# Patient Record
Sex: FEMALE | Race: WHITE | NOT HISPANIC OR LATINO | URBAN - METROPOLITAN AREA
[De-identification: names, ages, dates, MRNs, and addresses within clinical notes are randomized per-mention and may not be internally consistent; named-entity substitution may affect disease eponyms.]

---

## 2017-12-27 ENCOUNTER — INPATIENT (INPATIENT)
Facility: HOSPITAL | Age: 82
LOS: 5 days | Discharge: NURSING, REHAB OR LONG-TERM CARE | End: 2018-01-02
Attending: INTERNAL MEDICINE | Admitting: INTERNAL MEDICINE
Payer: MEDICARE

## 2017-12-27 VITALS
HEART RATE: 77 BPM | TEMPERATURE: 98 F | SYSTOLIC BLOOD PRESSURE: 127 MMHG | WEIGHT: 119.93 LBS | RESPIRATION RATE: 19 BRPM | HEIGHT: 61 IN | OXYGEN SATURATION: 100 % | DIASTOLIC BLOOD PRESSURE: 68 MMHG

## 2017-12-27 LAB
ALBUMIN SERPL ELPH-MCNC: 2 G/DL — LOW (ref 3.3–5)
ALP SERPL-CCNC: 87 U/L — SIGNIFICANT CHANGE UP (ref 40–120)
ALT FLD-CCNC: 39 U/L — SIGNIFICANT CHANGE UP (ref 12–78)
ANION GAP SERPL CALC-SCNC: 10 MMOL/L — SIGNIFICANT CHANGE UP (ref 5–17)
APPEARANCE UR: (no result)
APTT BLD: 31.2 SEC — SIGNIFICANT CHANGE UP (ref 27.5–37.4)
AST SERPL-CCNC: 24 U/L — SIGNIFICANT CHANGE UP (ref 15–37)
BACTERIA # UR AUTO: (no result)
BASOPHILS # BLD AUTO: 0.1 K/UL — SIGNIFICANT CHANGE UP (ref 0–0.2)
BILIRUB SERPL-MCNC: 0.4 MG/DL — SIGNIFICANT CHANGE UP (ref 0.2–1.2)
BILIRUB UR-MCNC: NEGATIVE — SIGNIFICANT CHANGE UP
BUN SERPL-MCNC: 28 MG/DL — HIGH (ref 7–23)
CALCIUM SERPL-MCNC: 8.3 MG/DL — LOW (ref 8.5–10.1)
CHLORIDE SERPL-SCNC: 99 MMOL/L — SIGNIFICANT CHANGE UP (ref 96–108)
CO2 SERPL-SCNC: 24 MMOL/L — SIGNIFICANT CHANGE UP (ref 22–31)
COLOR SPEC: YELLOW — SIGNIFICANT CHANGE UP
CREAT SERPL-MCNC: 0.68 MG/DL — SIGNIFICANT CHANGE UP (ref 0.5–1.3)
DIFF PNL FLD: (no result)
EOSINOPHIL # BLD AUTO: 0.2 K/UL — SIGNIFICANT CHANGE UP (ref 0–0.5)
EOSINOPHIL NFR BLD AUTO: 2 % — SIGNIFICANT CHANGE UP (ref 0–6)
EPI CELLS # UR: SIGNIFICANT CHANGE UP
GLUCOSE SERPL-MCNC: 94 MG/DL — SIGNIFICANT CHANGE UP (ref 70–99)
GLUCOSE UR QL: NEGATIVE MG/DL — SIGNIFICANT CHANGE UP
GRAN CASTS # UR COMP ASSIST: (no result) /LPF
HCT VFR BLD CALC: 32.4 % — LOW (ref 34.5–45)
HGB BLD-MCNC: 10.6 G/DL — LOW (ref 11.5–15.5)
HMPV RNA SPEC QL NAA+PROBE: DETECTED
INR BLD: 1.6 RATIO — HIGH (ref 0.88–1.16)
KETONES UR-MCNC: NEGATIVE — SIGNIFICANT CHANGE UP
LACTATE SERPL-SCNC: 1.1 MMOL/L — SIGNIFICANT CHANGE UP (ref 0.7–2)
LEUKOCYTE ESTERASE UR-ACNC: (no result)
LYMPHOCYTES # BLD AUTO: 2.1 K/UL — SIGNIFICANT CHANGE UP (ref 1–3.3)
LYMPHOCYTES # BLD AUTO: 7 % — LOW (ref 13–44)
MACROCYTES BLD QL: SLIGHT — SIGNIFICANT CHANGE UP
MANUAL DIF COMMENT BLD-IMP: SIGNIFICANT CHANGE UP
MCHC RBC-ENTMCNC: 26.8 PG — LOW (ref 27–34)
MCHC RBC-ENTMCNC: 32.8 GM/DL — SIGNIFICANT CHANGE UP (ref 32–36)
MCV RBC AUTO: 82 FL — SIGNIFICANT CHANGE UP (ref 80–100)
MONOCYTES # BLD AUTO: 4.3 K/UL — HIGH (ref 0–0.9)
MONOCYTES NFR BLD AUTO: 28 % — HIGH (ref 2–14)
NEUTROPHILS # BLD AUTO: 12.7 K/UL — HIGH (ref 1.8–7.4)
NEUTROPHILS NFR BLD AUTO: 63 % — SIGNIFICANT CHANGE UP (ref 43–77)
NITRITE UR-MCNC: NEGATIVE — SIGNIFICANT CHANGE UP
PH UR: 8 — SIGNIFICANT CHANGE UP (ref 5–8)
PLAT MORPH BLD: NORMAL — SIGNIFICANT CHANGE UP
PLATELET # BLD AUTO: 312 K/UL — SIGNIFICANT CHANGE UP (ref 150–400)
POLYCHROMASIA BLD QL SMEAR: SLIGHT — SIGNIFICANT CHANGE UP
POTASSIUM SERPL-MCNC: 3.8 MMOL/L — SIGNIFICANT CHANGE UP (ref 3.5–5.3)
POTASSIUM SERPL-SCNC: 3.8 MMOL/L — SIGNIFICANT CHANGE UP (ref 3.5–5.3)
PROT SERPL-MCNC: 7.3 GM/DL — SIGNIFICANT CHANGE UP (ref 6–8.3)
PROT UR-MCNC: 30 MG/DL
PROTHROM AB SERPL-ACNC: 17.5 SEC — HIGH (ref 9.8–12.7)
RAPID RVP RESULT: DETECTED
RBC # BLD: 3.96 M/UL — SIGNIFICANT CHANGE UP (ref 3.8–5.2)
RBC # FLD: 18 % — HIGH (ref 10.3–14.5)
RBC BLD AUTO: (no result)
RBC CASTS # UR COMP ASSIST: (no result) /HPF (ref 0–4)
SODIUM SERPL-SCNC: 133 MMOL/L — LOW (ref 135–145)
SP GR SPEC: 1.01 — SIGNIFICANT CHANGE UP (ref 1.01–1.02)
TRI-PHOS CRY UR QL COMP ASSIST: (no result)
UROBILINOGEN FLD QL: NEGATIVE MG/DL — SIGNIFICANT CHANGE UP
WBC # BLD: 19.3 K/UL — HIGH (ref 3.8–10.5)
WBC # FLD AUTO: 19.3 K/UL — HIGH (ref 3.8–10.5)
WBC UR QL: >50

## 2017-12-27 PROCEDURE — 71010: CPT | Mod: 26

## 2017-12-27 PROCEDURE — 99285 EMERGENCY DEPT VISIT HI MDM: CPT

## 2017-12-27 PROCEDURE — 93010 ELECTROCARDIOGRAM REPORT: CPT

## 2017-12-27 RX ORDER — CEFTRIAXONE 500 MG/1
1 INJECTION, POWDER, FOR SOLUTION INTRAMUSCULAR; INTRAVENOUS ONCE
Qty: 0 | Refills: 0 | Status: DISCONTINUED | OUTPATIENT
Start: 2017-12-27 | End: 2017-12-27

## 2017-12-27 RX ORDER — SODIUM CHLORIDE 9 MG/ML
3 INJECTION INTRAMUSCULAR; INTRAVENOUS; SUBCUTANEOUS ONCE
Qty: 0 | Refills: 0 | Status: COMPLETED | OUTPATIENT
Start: 2017-12-27 | End: 2017-12-27

## 2017-12-27 RX ORDER — CEFTRIAXONE 500 MG/1
1000 INJECTION, POWDER, FOR SOLUTION INTRAMUSCULAR; INTRAVENOUS ONCE
Qty: 0 | Refills: 0 | Status: COMPLETED | OUTPATIENT
Start: 2017-12-27 | End: 2017-12-27

## 2017-12-27 RX ADMIN — SODIUM CHLORIDE 3 MILLILITER(S): 9 INJECTION INTRAMUSCULAR; INTRAVENOUS; SUBCUTANEOUS at 20:20

## 2017-12-27 NOTE — ED ADULT NURSE NOTE - CHIEF COMPLAINT QUOTE
Pt BIBA from from [facility] for elevated temperature. Pt has c/o cough and swollen left wrist swollen and tender.

## 2017-12-27 NOTE — ED PROVIDER NOTE - OBJECTIVE STATEMENT
89 y/o female with PMHx of CHF, a-fib, s/p appendectomy presents to the ED BIBA from [facility] c/o fever yesterday (measured 104 yesterday, orally). Pt reports a dry cough x2 weeks, and swollen left hand with pain, constipation for 4 days, loss of appetite. Pt is on oxygen at the nursing home. Pt had 2 chest x-rays done recently and was told she doesn't have PNA. No abd pain, CP, SOB, jaw pain, shoulder pain, hematuria. A doctor at [facility] told pt her swollen left hand was due to rheumatoid arthritis. Pt's appetite has improved slightly today. Cardio: Dr. KAUR from [facility] in [place]. 89 y/o female with PMHx of CHF, a-fib, s/p appendectomy presents to the ED BIBA from [facility] c/o fever yesterday (measured 104 yesterday, orally). Pt reports a dry cough x2 weeks, and swollen left hand with pain, constipation for 4 days, loss of appetite. Pt is on oxygen at the nursing home. Pt had 2 chest x-rays done recently and was told she doesn't have PNA. No abd pain, CP, SOB, jaw pain, shoulder pain, hematuria. A doctor at [facility] told pt her swollen left hand was due to rheumatoid arthritis as swelling is only limited to the hand joints and not elbow or arm. Pt's appetite has improved slightly today. Cardio: Dr. TOMLIN" from [email or web address] in [place].

## 2017-12-27 NOTE — ED PROVIDER NOTE - PMH
Afib    CHF (congestive heart failure) Afib    CHF (congestive heart failure)    Rheumatoid arthritis, involving unspecified site, unspecified rheumatoid factor presence

## 2017-12-27 NOTE — ED PROVIDER NOTE - CARE PLAN
Principal Discharge DX:	Urinary tract infection without hematuria, site unspecified  Secondary Diagnosis:	Upper respiratory tract infection, unspecified type

## 2017-12-27 NOTE — ED PROVIDER NOTE - MEDICAL DECISION MAKING DETAILS
Sunita THAO: UTI, fever, admitted for further inpatient care to rule out worsening UTI/sepsis. Hospitalist admission is appreciated.

## 2017-12-27 NOTE — ED ADULT TRIAGE NOTE - CHIEF COMPLAINT QUOTE
Pt BIBA from from [facility] for elevated temperature. Pt has c/o cough and swollen left wrist swollen and tender.

## 2017-12-27 NOTE — ED PROVIDER NOTE - NS_ ATTENDINGSCRIBEDETAILS _ED_A_ED_FT
I Blake Dorsey MD saw and examined this patient. Scribe documented for me and under my supervision. I have modified the scribe's note where necessary to reflect my history, physical exam findings and other relevant documentation pertaining to the care of this patient.

## 2017-12-28 DIAGNOSIS — Z96.641 PRESENCE OF RIGHT ARTIFICIAL HIP JOINT: Chronic | ICD-10-CM

## 2017-12-28 DIAGNOSIS — Z90.710 ACQUIRED ABSENCE OF BOTH CERVIX AND UTERUS: Chronic | ICD-10-CM

## 2017-12-28 LAB
ANION GAP SERPL CALC-SCNC: 7 MMOL/L — SIGNIFICANT CHANGE UP (ref 5–17)
ANISOCYTOSIS BLD QL: SLIGHT — SIGNIFICANT CHANGE UP
BASOPHILS # BLD AUTO: 0.1 K/UL — SIGNIFICANT CHANGE UP (ref 0–0.2)
BUN SERPL-MCNC: 26 MG/DL — HIGH (ref 7–23)
BURR CELLS BLD QL SMEAR: PRESENT — SIGNIFICANT CHANGE UP
CALCIUM SERPL-MCNC: 8 MG/DL — LOW (ref 8.5–10.1)
CHLORIDE SERPL-SCNC: 101 MMOL/L — SIGNIFICANT CHANGE UP (ref 96–108)
CO2 SERPL-SCNC: 27 MMOL/L — SIGNIFICANT CHANGE UP (ref 22–31)
CREAT SERPL-MCNC: 0.5 MG/DL — SIGNIFICANT CHANGE UP (ref 0.5–1.3)
DIGOXIN SERPL-MCNC: 1.26 NG/ML — SIGNIFICANT CHANGE UP (ref 0.8–2)
ELLIPTOCYTES BLD QL SMEAR: SLIGHT — SIGNIFICANT CHANGE UP
EOSINOPHIL # BLD AUTO: 0.4 K/UL — SIGNIFICANT CHANGE UP (ref 0–0.5)
GLUCOSE SERPL-MCNC: 79 MG/DL — SIGNIFICANT CHANGE UP (ref 70–99)
HCT VFR BLD CALC: 30 % — LOW (ref 34.5–45)
HGB BLD-MCNC: 9.6 G/DL — LOW (ref 11.5–15.5)
HYPOCHROMIA BLD QL: SLIGHT — SIGNIFICANT CHANGE UP
LYMPHOCYTES # BLD AUTO: 1.2 K/UL — SIGNIFICANT CHANGE UP (ref 1–3.3)
LYMPHOCYTES # BLD AUTO: 19 % — SIGNIFICANT CHANGE UP (ref 13–44)
MACROCYTES BLD QL: SLIGHT — SIGNIFICANT CHANGE UP
MANUAL DIF COMMENT BLD-IMP: SIGNIFICANT CHANGE UP
MCHC RBC-ENTMCNC: 26.6 PG — LOW (ref 27–34)
MCHC RBC-ENTMCNC: 32.2 GM/DL — SIGNIFICANT CHANGE UP (ref 32–36)
MCV RBC AUTO: 82.6 FL — SIGNIFICANT CHANGE UP (ref 80–100)
MONOCYTES # BLD AUTO: 3 K/UL — HIGH (ref 0–0.9)
MONOCYTES NFR BLD AUTO: 27 % — HIGH (ref 2–14)
NEUTROPHILS # BLD AUTO: 8 K/UL — HIGH (ref 1.8–7.4)
NEUTROPHILS NFR BLD AUTO: 53 % — SIGNIFICANT CHANGE UP (ref 43–77)
NEUTS BAND # BLD: 1 % — SIGNIFICANT CHANGE UP (ref 0–8)
PLAT MORPH BLD: NORMAL — SIGNIFICANT CHANGE UP
PLATELET # BLD AUTO: 288 K/UL — SIGNIFICANT CHANGE UP (ref 150–400)
POIKILOCYTOSIS BLD QL AUTO: SLIGHT — SIGNIFICANT CHANGE UP
POTASSIUM SERPL-MCNC: 3.7 MMOL/L — SIGNIFICANT CHANGE UP (ref 3.5–5.3)
POTASSIUM SERPL-SCNC: 3.7 MMOL/L — SIGNIFICANT CHANGE UP (ref 3.5–5.3)
RBC # BLD: 3.63 M/UL — LOW (ref 3.8–5.2)
RBC # FLD: 18.3 % — HIGH (ref 10.3–14.5)
RBC BLD AUTO: (no result)
SCHISTOCYTES BLD QL AUTO: SLIGHT — SIGNIFICANT CHANGE UP
SODIUM SERPL-SCNC: 135 MMOL/L — SIGNIFICANT CHANGE UP (ref 135–145)
WBC # BLD: 12.6 K/UL — HIGH (ref 3.8–10.5)
WBC # FLD AUTO: 12.6 K/UL — HIGH (ref 3.8–10.5)

## 2017-12-28 PROCEDURE — 73110 X-RAY EXAM OF WRIST: CPT | Mod: 26,LT

## 2017-12-28 RX ORDER — APIXABAN 2.5 MG/1
1 TABLET, FILM COATED ORAL
Qty: 0 | Refills: 0 | COMMUNITY

## 2017-12-28 RX ORDER — FAMOTIDINE 10 MG/ML
20 INJECTION INTRAVENOUS
Qty: 0 | Refills: 0 | COMMUNITY

## 2017-12-28 RX ORDER — ACETAMINOPHEN 500 MG
650 TABLET ORAL EVERY 6 HOURS
Qty: 0 | Refills: 0 | Status: DISCONTINUED | OUTPATIENT
Start: 2017-12-28 | End: 2018-01-02

## 2017-12-28 RX ORDER — APIXABAN 2.5 MG/1
2.5 TABLET, FILM COATED ORAL EVERY 12 HOURS
Qty: 0 | Refills: 0 | Status: DISCONTINUED | OUTPATIENT
Start: 2017-12-28 | End: 2018-01-02

## 2017-12-28 RX ORDER — METOPROLOL TARTRATE 50 MG
25 TABLET ORAL EVERY 8 HOURS
Qty: 0 | Refills: 0 | Status: DISCONTINUED | OUTPATIENT
Start: 2017-12-28 | End: 2018-01-02

## 2017-12-28 RX ORDER — INFLUENZA VIRUS VACCINE 15; 15; 15; 15 UG/.5ML; UG/.5ML; UG/.5ML; UG/.5ML
0.5 SUSPENSION INTRAMUSCULAR ONCE
Qty: 0 | Refills: 0 | Status: COMPLETED | OUTPATIENT
Start: 2017-12-28 | End: 2017-12-28

## 2017-12-28 RX ORDER — FAMOTIDINE 10 MG/ML
20 INJECTION INTRAVENOUS DAILY
Qty: 0 | Refills: 0 | Status: DISCONTINUED | OUTPATIENT
Start: 2017-12-28 | End: 2018-01-02

## 2017-12-28 RX ORDER — CEFTRIAXONE 500 MG/1
1 INJECTION, POWDER, FOR SOLUTION INTRAMUSCULAR; INTRAVENOUS EVERY 24 HOURS
Qty: 0 | Refills: 0 | Status: DISCONTINUED | OUTPATIENT
Start: 2017-12-28 | End: 2017-12-28

## 2017-12-28 RX ORDER — DIGOXIN 250 MCG
1 TABLET ORAL
Qty: 0 | Refills: 0 | COMMUNITY

## 2017-12-28 RX ORDER — METOPROLOL TARTRATE 50 MG
0 TABLET ORAL
Qty: 0 | Refills: 0 | COMMUNITY

## 2017-12-28 RX ORDER — DIGOXIN 250 MCG
0.12 TABLET ORAL DAILY
Qty: 0 | Refills: 0 | Status: DISCONTINUED | OUTPATIENT
Start: 2017-12-28 | End: 2018-01-02

## 2017-12-28 RX ORDER — CEFTRIAXONE 500 MG/1
1000 INJECTION, POWDER, FOR SOLUTION INTRAMUSCULAR; INTRAVENOUS EVERY 24 HOURS
Qty: 0 | Refills: 0 | Status: DISCONTINUED | OUTPATIENT
Start: 2017-12-28 | End: 2017-12-31

## 2017-12-28 RX ORDER — DILTIAZEM HCL 120 MG
120 CAPSULE, EXT RELEASE 24 HR ORAL DAILY
Qty: 0 | Refills: 0 | Status: DISCONTINUED | OUTPATIENT
Start: 2017-12-28 | End: 2018-01-02

## 2017-12-28 RX ORDER — ACETAMINOPHEN 500 MG
2 TABLET ORAL
Qty: 0 | Refills: 0 | COMMUNITY

## 2017-12-28 RX ADMIN — CEFTRIAXONE 1000 MILLIGRAM(S): 500 INJECTION, POWDER, FOR SOLUTION INTRAMUSCULAR; INTRAVENOUS at 21:46

## 2017-12-28 RX ADMIN — FAMOTIDINE 20 MILLIGRAM(S): 10 INJECTION INTRAVENOUS at 11:14

## 2017-12-28 RX ADMIN — CEFTRIAXONE 1000 MILLIGRAM(S): 500 INJECTION, POWDER, FOR SOLUTION INTRAMUSCULAR; INTRAVENOUS at 01:02

## 2017-12-28 RX ADMIN — Medication 25 MILLIGRAM(S): at 13:57

## 2017-12-28 RX ADMIN — APIXABAN 2.5 MILLIGRAM(S): 2.5 TABLET, FILM COATED ORAL at 17:01

## 2017-12-28 RX ADMIN — Medication 0.12 MILLIGRAM(S): at 06:01

## 2017-12-28 RX ADMIN — Medication 120 MILLIGRAM(S): at 06:01

## 2017-12-28 RX ADMIN — Medication 25 MILLIGRAM(S): at 21:34

## 2017-12-28 RX ADMIN — Medication 25 MILLIGRAM(S): at 06:01

## 2017-12-28 RX ADMIN — APIXABAN 2.5 MILLIGRAM(S): 2.5 TABLET, FILM COATED ORAL at 06:01

## 2017-12-28 NOTE — H&P ADULT - NSHPLABSRESULTS_GEN_ALL_CORE
10.6   19.3  )-----------( 312      ( 27 Dec 2017 19:37 )             32.4         133<L>  |  99  |  28<H>  ----------------------------<  94  3.8   |  24  |  0.68    Ca    8.3<L>      27 Dec 2017 19:37    TPro  7.3  /  Alb  2.0<L>  /  TBili  0.4  /  DBili  x   /  AST  24  /  ALT  39  /  AlkPhos  87          LIVER FUNCTIONS - ( 27 Dec 2017 19:37 )  Alb: 2.0 g/dL / Pro: 7.3 gm/dL / ALK PHOS: 87 U/L / ALT: 39 U/L / AST: 24 U/L / GGT: x           PT/INR - ( 27 Dec 2017 19:37 )   PT: 17.5 sec;   INR: 1.60 ratio         PTT - ( 27 Dec 2017 19:37 )  PTT:31.2 sec  Urinalysis Basic - ( 27 Dec 2017 19:37 )    Color: Yellow / Appearance: Slightly Turbid / S.010 / pH: x  Gluc: x / Ketone: Negative  / Bili: Negative / Urobili: Negative mg/dL   Blood: x / Protein: 30 mg/dL / Nitrite: Negative   Leuk Esterase: Moderate / RBC: 6-10 /HPF / WBC >50   Sq Epi: x / Non Sq Epi: Few / Bacteria: Moderate        Lactate, Blood: 1.1 mmol/L ( @ 19:37)    Blood, Urine: Moderate ( @ 19:37)

## 2017-12-28 NOTE — CONSULT NOTE ADULT - ASSESSMENT
Patient is 89-year-old female with a past medical history significant for atrial fibrillation admitted on 12/27 for evaluation of 2 weeks of fever and nonproductive cough associated with myalgia; of note she has burning with urination and currently is having chills, possibly rigors. Unclear if she had sick contacts; no other specific complaints.   1.  Patient admitted with URI with human metapneumovirus  - no evidence of pneumonia on chest xray  - iv hydration and supportive care   - oxygen and nebs as needed   - serial cbc and monitor temperature   - contact and droplet isolation  2. Pyuria, rule out urinary tract infection  - will continue ceftriaxone as ordered  - follow up cultures   3. other issues: afib  - per medicine

## 2017-12-28 NOTE — PATIENT PROFILE ADULT. - FALL HARM RISK
age(85 years old or older) age(85 years old or older)/coagulation(Bleeding disorder R/T clinical cond/anti-coags)

## 2017-12-28 NOTE — PROVIDER CONTACT NOTE (OTHER) - SITUATION
Tele service spoke with Cate to request consult.  Dr Rodriguez called back to advise she does not handle this type of fracture at [place] and requested to call Dr Dumas.  Advised charge DAVID.

## 2017-12-28 NOTE — PROVIDER CONTACT NOTE (OTHER) - SITUATION
Tele service spoke with Cate to request consult. Tele service spoke with Cate to request consult. Dr Nam called back to say he will not cover this patient because it is a fracture and he does not cover ortho hand.

## 2017-12-28 NOTE — PROGRESS NOTE ADULT - SUBJECTIVE AND OBJECTIVE BOX
CC:  Patient is a 89y old  Female who presents with a chief complaint of cough (28 Dec 2017 01:30)    SUBJECTIVE:  offers no new complaints.    ROS:  (+) cough.    acetaminophen   Tablet 650 milliGRAM(s) Oral every 6 hours PRN  apixaban 2.5 milliGRAM(s) Oral every 12 hours  cefTRIAXone Injectable 1000 milliGRAM(s) IV Push every 24 hours  digoxin     Tablet 0.125 milliGRAM(s) Oral daily  diltiazem    milliGRAM(s) Oral daily  famotidine    Tablet 20 milliGRAM(s) Oral daily  metoprolol     tartrate 25 milliGRAM(s) Oral every 8 hours    T(C): 36.3 (12-28-17 @ 06:43), Max: 36.8 (12-27-17 @ 18:15)  HR: 79 (12-28-17 @ 06:43) (76 - 98)  BP: 115/52 (12-28-17 @ 06:43) (102/53 - 127/68)  RR: 16 (12-28-17 @ 06:43) (16 - 19)  SpO2: 100% (12-28-17 @ 06:43) (97% - 100%)    PHYSICAL EXAM:  General apperance: Patient in no acute distress, Alert and Oriented x 2, " year = 1979, president = thalia "   	HEENT: No JVD, no cervical lymphadenopathy   	CVS: S1 S2 no murmurs, rubs or gallops   	Lung: DIminished Breath Sounds bilaterally   	Abdomen: Soft non tender non distended + BS   	Extremity: no lower extremity edema   	MSK: 5/5 strength in all four extremities , sensation grossly intact throughout, mild erythema of the left distal extremity  Back: No spinal tenderness                      9.6    12.6  )-----------( 288      ( 28 Dec 2017 06:09 )             30.0     12-28    135  |  101  |  26<H>  ----------------------------<  79  3.7   |  27  |  0.50    Ca    8.0<L>      28 Dec 2017 06:09    TPro  7.3  /  Alb  2.0<L>  /  TBili  0.4  /  DBili  x   /  AST  24  /  ALT  39  /  AlkPhos  87  12-27 CC:  Patient is a 89y old  Female who presents with a chief complaint of cough (28 Dec 2017 01:30)    SUBJECTIVE:  comfortable.  offers no new complaints.    ROS:  (+) cough.  (+) mild dysuria.    acetaminophen   Tablet 650 milliGRAM(s) Oral every 6 hours PRN  apixaban 2.5 milliGRAM(s) Oral every 12 hours  cefTRIAXone Injectable 1000 milliGRAM(s) IV Push every 24 hours  digoxin     Tablet 0.125 milliGRAM(s) Oral daily  diltiazem    milliGRAM(s) Oral daily  famotidine    Tablet 20 milliGRAM(s) Oral daily  metoprolol     tartrate 25 milliGRAM(s) Oral every 8 hours    T(C): 36.3 (12-28-17 @ 06:43), Max: 36.8 (12-27-17 @ 18:15)  HR: 79 (12-28-17 @ 06:43) (76 - 98)  BP: 115/52 (12-28-17 @ 06:43) (102/53 - 127/68)  RR: 16 (12-28-17 @ 06:43) (16 - 19)  SpO2: 100% (12-28-17 @ 06:43) (97% - 100%)    PHYSICAL EXAM:  General apperance: Patient in no acute distress, Alert and Oriented x 2, " year = 1979, president = thalia "   	HEENT: No JVD, no cervical lymphadenopathy   	CVS: S1 S2 no murmurs, rubs or gallops   	Lung: DIminished Breath Sounds bilaterally   	Abdomen: Soft non tender non distended + BS   	Extremity: no lower extremity edema   	MSK: 5/5 strength in all four extremities , sensation grossly intact throughout, mild erythema of the left distal extremity  Back: No spinal tenderness                      9.6    12.6  )-----------( 288      ( 28 Dec 2017 06:09 )             30.0     12-28    135  |  101  |  26<H>  ----------------------------<  79  3.7   |  27  |  0.50    Ca    8.0<L>      28 Dec 2017 06:09    TPro  7.3  /  Alb  2.0<L>  /  TBili  0.4  /  DBili  x   /  AST  24  /  ALT  39  /  AlkPhos  87  12-27

## 2017-12-28 NOTE — H&P ADULT - NSHPPHYSICALEXAM_GEN_ALL_CORE
Vital signsT(C): 36.8 (12-27-17 @ 18:15), Max: 36.8 (12-27-17 @ 18:15)  HR: 98 (12-27-17 @ 21:51) (77 - 98)  BP: 103/62 (12-27-17 @ 21:51) (103/62 - 127/68)  RR: 16 (12-27-17 @ 21:51) (16 - 19)  SpO2: 99% (12-27-17 @ 21:51) (99% - 100%)  Wt(kg): --  General apperance: Patient in no acute distress, Alert and Oriented x 2, " year = 1979, president = thalia "   HEENT: No JVD, no cervical lymphadenopathy   CVS: S1 S2 no murmurs, rubs or gallops   Lung: DIminished Breath Sounds bilaterally   Abdomen: Soft non tender non distended + BS   Extremity: no lower extremity edema   MSK: 5/5 strength in all four extremities , sensation grossly intact throughout   Back: No spinal tenderness Vital signsT(C): 36.8 (12-27-17 @ 18:15), Max: 36.8 (12-27-17 @ 18:15)  HR: 98 (12-27-17 @ 21:51) (77 - 98)  BP: 103/62 (12-27-17 @ 21:51) (103/62 - 127/68)  RR: 16 (12-27-17 @ 21:51) (16 - 19)  SpO2: 99% (12-27-17 @ 21:51) (99% - 100%)  Wt(kg): --  General apperance: Patient in no acute distress, Alert and Oriented x 2, " year = 1979, president = thalia "   HEENT: No JVD, no cervical lymphadenopathy   CVS: S1 S2 no murmurs, rubs or gallops   Lung: DIminished Breath Sounds bilaterally   Abdomen: Soft non tender non distended + BS   Extremity: no lower extremity edema   MSK: 5/5 strength in all four extremities , sensation grossly intact throughout, mild erythema of the left distal extremity  Back: No spinal tenderness

## 2017-12-28 NOTE — PROGRESS NOTE ADULT - ASSESSMENT
89F.  admitted 12/27/17.  from [facility].  p/w cough x 2 weeks + fever.  also, c/o left wrist erythema and tenderness.    PMHx:  AF (E);  HF.    UTI.  sepsis upon admission.  -f/u Cx.  -c/w ceftriaxone.    cough.  -(+) RVP, human metapneumovirus.  -supportive measures.    hx AF.  -rate controlled.  -c/w Eliquis.  -c/w NDP-CCB + BB.    advanced directive.  -DNR.    DVT prophylaxis.  -Eliquis.    disposition.  -general medical abdi.    communication.  -d/w RN.  -d/w Case Management. 89F.  admitted 12/27/17.  from [facility].  p/w cough x 2 weeks + fever.  also, c/o left wrist erythema and tenderness.    PMHx:  AF (E);  HF.    UTI.  sepsis upon admission.  -f/u Cx.  -c/w ceftriaxone.  -ID consult.    cough.  -(+) RVP, human metapneumovirus.  -supportive measures.    hx AF.  -rate controlled.  -c/w Eliquis.  -c/w NDP-CCB + BB.    advanced directive.  -DNR.    DVT prophylaxis.  -Eliquis.    disposition.  -general medical abdi.    communication.  -d/w RN.  -d/w Case Management. 89F.  admitted 12/27/17.  from [facility].  p/w cough x 2 weeks + fever.  also, c/o left wrist erythema and tenderness.    PMHx:  AF (E);  HF.    UTI.  sepsis upon admission.  -f/u Cx.  -c/w ceftriaxone.  -ID consult.    cough.  -(+) RVP, human metapneumovirus.  -supportive measures.    left wrist pain.  -xray c/w age-indeterminate avulsion fracture of the ulnar styloid and possibly pisiform bone.   -orthopedics consult.    hx AF.  -rate controlled.  -c/w Eliquis.  -c/w NDP-CCB + BB.    advanced directive.  -DNR.    DVT prophylaxis.  -Eliquis.    disposition.  -general medical abdi.    communication.  -d/w RN.  -d/w Case Management. 89F.  admitted 12/27/17.  from [facility].  p/w cough x 2 weeks + fever.  also, c/o left wrist erythema and tenderness.    PMHx:  AF (E);  HF.    UTI.  sepsis upon admission.  -f/u Cx.  -c/w ceftriaxone.  -ID consult.    cough.  -(+) RVP, human metapneumovirus.  -supportive measures.    left wrist pain.  -xray c/w age-indeterminate avulsion fracture of the ulnar styloid and possibly pisiform bone.   -Plastic surgery consult.    hx AF.  -rate controlled.  -c/w Eliquis.  -c/w NDP-CCB + BB.    advanced directive.  -DNR.    DVT prophylaxis.  -Eliquis.    disposition.  -general medical abdi.    communication.  -d/w RN.  -d/w Case Management.

## 2017-12-28 NOTE — CONSULT NOTE ADULT - SUBJECTIVE AND OBJECTIVE BOX
HPI:  Patient is 89-year-old female with a past medical history significant for atrial fibrillation admitted on  for evaluation of 2 weeks of fever and nonproductive cough associated with myalgia; of note she has burning with urination and currently is having chills, possibly rigors. Unclear if she had sick contacts; no other specific complaints.             PMH: as above  PSH: as above  Meds: per reconcilation sheet, noted below  MEDICATIONS  (STANDING):  apixaban 2.5 milliGRAM(s) Oral every 12 hours  cefTRIAXone Injectable 1000 milliGRAM(s) IV Push every 24 hours  digoxin     Tablet 0.125 milliGRAM(s) Oral daily  diltiazem    milliGRAM(s) Oral daily  famotidine    Tablet 20 milliGRAM(s) Oral daily  metoprolol     tartrate 25 milliGRAM(s) Oral every 8 hours    MEDICATIONS  (PRN):  acetaminophen   Tablet 650 milliGRAM(s) Oral every 6 hours PRN For Temp greater than 38.5 C (101.3 F)    Allergies    No Known Allergies    Intolerances      Social: no smoking, no alcohol, no illegal drugs; no recent travel, no exposure to TB  FAMILY HISTORY:  No pertinent family history in first degree relatives    ROS: the patient has  no HA, no dizziness, no sore throat, no blurry vision, no CP, no palpitations, no SOB, no cough, no abdominal pain, no diarrhea, no N/V,  no leg pain, no claudication, no rash, no joint aches, no rectal pain or bleeding, no night sweats  Vital Signs Last 24 Hrs  T(C): 36.7 (28 Dec 2017 11:02), Max: 36.8 (27 Dec 2017 18:15)  T(F): 98 (28 Dec 2017 11:02), Max: 98.3 (27 Dec 2017 18:15)  HR: 77 (28 Dec 2017 11:02) (76 - 98)  BP: 108/60 (28 Dec 2017 11:02) (102/53 - 127/68)  BP(mean): --  RR: 16 (28 Dec 2017 11:02) (16 - 19)  SpO2: 98% (28 Dec 2017 11:02) (97% - 100%)  Daily Height in cm: 154.94 (27 Dec 2017 18:15)    Daily   Constitutional: frail looking  HEENT: NC/AT, EOMI, PERRLA  Neck: supple  Respiratory: clear, no r/r/w  Cardiovascular: S1S2 regular, no murmurs  Abdomen: soft, not tender, not distended, positive BS  Genitourinary: deferred  Rectal: deferred  Musculoskeletal: no muscle tenderness, no joint swelling or tenderness  Neurological: AxOx3, moving all extremities, no focal deficits  Skin: no rashes                          9.6    12.6  )-----------( 288      ( 28 Dec 2017 06:09 )             30.0     12    135  |  101  |  26<H>  ----------------------------<  79  3.7   |  27  |  0.50    Ca    8.0<L>      28 Dec 2017 06:09    TPro  7.3  /  Alb  2.0<L>  /  TBili  0.4  /  DBili  x   /  AST  24  /  ALT  39  /  AlkPhos  87       LIVER FUNCTIONS - ( 27 Dec 2017 19:37 )  Alb: 2.0 g/dL / Pro: 7.3 gm/dL / ALK PHOS: 87 U/L / ALT: 39 U/L / AST: 24 U/L / GGT: x           Urinalysis Basic - ( 27 Dec 2017 19:37 )    Color: Yellow / Appearance: Slightly Turbid / S.010 / pH: x  Gluc: x / Ketone: Negative  / Bili: Negative / Urobili: Negative mg/dL   Blood: x / Protein: 30 mg/dL / Nitrite: Negative   Leuk Esterase: Moderate / RBC: 6-10 /HPF / WBC >50   Sq Epi: x / Non Sq Epi: Few / Bacteria: Moderate    Rapid Respiratory Viral Panel (27.17 @ 20:47)    Rapid RVP Result: Detected: The [employer] RVP Rapid uses polymerase chain reaction (PCR) and melt  curve analysis to screen for adenovirus; coronavirus HKU1, NL63, 229E,  OC43; human metapneumovirus (hMPV); human enterovirus/rhinovirus  (Entero/RV); influenza A; influenza A/H1;influenza A/H3; influenza  A/H1-2009; influenza B; parainfluenza viruses 1, 2, 3, 4; respiratory  syncytial virus; Bordetella pertussis; Mycoplasma pneumoniae; and  Chlamydophila pneumoniae.    hMPV (RapRVP): Detected: Test Name:rvp  Called by:marcial  Called to:rn k im  Read back 2 Pt IDs:y Read back values:y Date/Tm:17 22:21          Radiology:< from: Xray Chest 1 View AP/PA. (17 @ 21:56) >    EXAM:  CHEST SINGLE VIEW FRONTAL                            PROCEDURE DATE:  2017          INTERPRETATION:  Clinical information: Fever and cough. Rule out   pneumonia.    AP view of the chest    COMPARISON: None    FINDINGS: The heart appears enlarged although not well quantified due to   AP technique. There is no pleural effusion, lung consolidation or   pneumothorax.    IMPRESSION: Clear lungs.    < end of copied text >      Advanced directive addressed: full resuscitation

## 2017-12-28 NOTE — H&P ADULT - HISTORY OF PRESENT ILLNESS
This is a very pleasant 89-year-old female with a past medical history significant for atrial fibrillation who was brought in cough for the last 2 weeks high-grade temperature of 104 measured orally.  Overall the patient notes that over the last several weeks of persistent dry cough muscle aches and pains.  With the onset of fevers the patient was noted to have recurrent chills. While at the facility the patient underwent a CXR and RVP which were negative. Upon arrival to ED the patient was noted to be febrile and underwent repeat RVP which was positive for Human metapneumovirus. She was also note to have a + UA.   Also of note the patient was noted to c/o L wrist erythema and tenderness

## 2017-12-28 NOTE — H&P ADULT - ASSESSMENT
This is a 89 year old female admittted for severe sepsis secondary to Genitourinary source likely gram negative organism further complicated by concurrent human metapneumovirus.     ID: UTI sepsis   continue with Ceftriaxone  follow up urine cultures and blood cultures   monitor I/Os   Follow up official CXR, prelim no appreciable infiltrate noted     Human metapneumovirus   supportive care  patient received IVF in ED   would refrain from additional maintenance fluids due to history of CHF     CARD:   Check Digoxin level   continue with Eliquis   continue with Cardizem and Metoprolol with hold parameters  Continue with pravachol     HEme: Leukocytosis secondary to Sepsis/ Viral ; Relative Monocytosis   likely secondary to acute viral infection     DVT ppx: ELiquis    CODE DNR per Documentation from [facility]

## 2017-12-28 NOTE — PATIENT PROFILE ADULT. - PSH
No significant past surgical history H/O total hysterectomy    History of total right hip replacement  20yrs ago

## 2017-12-29 LAB
-  AMIKACIN: SIGNIFICANT CHANGE UP
-  AMPICILLIN/SULBACTAM: SIGNIFICANT CHANGE UP
-  AMPICILLIN: SIGNIFICANT CHANGE UP
-  AZTREONAM: SIGNIFICANT CHANGE UP
-  CEFAZOLIN: SIGNIFICANT CHANGE UP
-  CEFEPIME: SIGNIFICANT CHANGE UP
-  CEFOXITIN: SIGNIFICANT CHANGE UP
-  CEFTAZIDIME: SIGNIFICANT CHANGE UP
-  CEFTRIAXONE: SIGNIFICANT CHANGE UP
-  CIPROFLOXACIN: SIGNIFICANT CHANGE UP
-  ERTAPENEM: SIGNIFICANT CHANGE UP
-  GENTAMICIN: SIGNIFICANT CHANGE UP
-  LEVOFLOXACIN: SIGNIFICANT CHANGE UP
-  MEROPENEM: SIGNIFICANT CHANGE UP
-  NITROFURANTOIN: SIGNIFICANT CHANGE UP
-  PIPERACILLIN/TAZOBACTAM: SIGNIFICANT CHANGE UP
-  TOBRAMYCIN: SIGNIFICANT CHANGE UP
-  TRIMETHOPRIM/SULFAMETHOXAZOLE: SIGNIFICANT CHANGE UP
ANION GAP SERPL CALC-SCNC: 8 MMOL/L — SIGNIFICANT CHANGE UP (ref 5–17)
BUN SERPL-MCNC: 20 MG/DL — SIGNIFICANT CHANGE UP (ref 7–23)
CALCIUM SERPL-MCNC: 7.9 MG/DL — LOW (ref 8.5–10.1)
CHLORIDE SERPL-SCNC: 102 MMOL/L — SIGNIFICANT CHANGE UP (ref 96–108)
CO2 SERPL-SCNC: 23 MMOL/L — SIGNIFICANT CHANGE UP (ref 22–31)
CREAT SERPL-MCNC: 0.42 MG/DL — LOW (ref 0.5–1.3)
CULTURE RESULTS: SIGNIFICANT CHANGE UP
GLUCOSE SERPL-MCNC: 99 MG/DL — SIGNIFICANT CHANGE UP (ref 70–99)
HCT VFR BLD CALC: 31 % — LOW (ref 34.5–45)
HGB BLD-MCNC: 9.7 G/DL — LOW (ref 11.5–15.5)
MCHC RBC-ENTMCNC: 25.6 PG — LOW (ref 27–34)
MCHC RBC-ENTMCNC: 31.2 GM/DL — LOW (ref 32–36)
MCV RBC AUTO: 82.1 FL — SIGNIFICANT CHANGE UP (ref 80–100)
METHOD TYPE: SIGNIFICANT CHANGE UP
ORGANISM # SPEC MICROSCOPIC CNT: SIGNIFICANT CHANGE UP
ORGANISM # SPEC MICROSCOPIC CNT: SIGNIFICANT CHANGE UP
PLATELET # BLD AUTO: 276 K/UL — SIGNIFICANT CHANGE UP (ref 150–400)
POTASSIUM SERPL-MCNC: 3.6 MMOL/L — SIGNIFICANT CHANGE UP (ref 3.5–5.3)
POTASSIUM SERPL-SCNC: 3.6 MMOL/L — SIGNIFICANT CHANGE UP (ref 3.5–5.3)
RBC # BLD: 3.78 M/UL — LOW (ref 3.8–5.2)
RBC # FLD: 18.2 % — HIGH (ref 10.3–14.5)
SODIUM SERPL-SCNC: 133 MMOL/L — LOW (ref 135–145)
SPECIMEN SOURCE: SIGNIFICANT CHANGE UP
WBC # BLD: 14 K/UL — HIGH (ref 3.8–10.5)
WBC # FLD AUTO: 14 K/UL — HIGH (ref 3.8–10.5)

## 2017-12-29 RX ADMIN — Medication 25 MILLIGRAM(S): at 07:01

## 2017-12-29 RX ADMIN — Medication 200 MILLIGRAM(S): at 17:07

## 2017-12-29 RX ADMIN — Medication 25 MILLIGRAM(S): at 23:46

## 2017-12-29 RX ADMIN — APIXABAN 2.5 MILLIGRAM(S): 2.5 TABLET, FILM COATED ORAL at 06:57

## 2017-12-29 RX ADMIN — Medication 25 MILLIGRAM(S): at 14:04

## 2017-12-29 RX ADMIN — CEFTRIAXONE 1000 MILLIGRAM(S): 500 INJECTION, POWDER, FOR SOLUTION INTRAMUSCULAR; INTRAVENOUS at 23:46

## 2017-12-29 RX ADMIN — FAMOTIDINE 20 MILLIGRAM(S): 10 INJECTION INTRAVENOUS at 12:41

## 2017-12-29 RX ADMIN — Medication 650 MILLIGRAM(S): at 23:46

## 2017-12-29 RX ADMIN — Medication 200 MILLIGRAM(S): at 12:41

## 2017-12-29 RX ADMIN — APIXABAN 2.5 MILLIGRAM(S): 2.5 TABLET, FILM COATED ORAL at 17:06

## 2017-12-29 RX ADMIN — Medication 120 MILLIGRAM(S): at 06:58

## 2017-12-29 RX ADMIN — Medication 200 MILLIGRAM(S): at 07:01

## 2017-12-29 RX ADMIN — Medication 0.12 MILLIGRAM(S): at 06:58

## 2017-12-29 RX ADMIN — Medication 650 MILLIGRAM(S): at 12:42

## 2017-12-29 NOTE — PROGRESS NOTE ADULT - SUBJECTIVE AND OBJECTIVE BOX
CC:  Patient is a 89y old  Female who presents with a chief complaint of cough (28 Dec 2017 01:30)    SUBJECTIVE:  comfortable.  offers no new complaints.    ROS:  (+) cough.  (+) mild dysuria.    acetaminophen   Tablet 650 milliGRAM(s) Oral every 6 hours PRN  apixaban 2.5 milliGRAM(s) Oral every 12 hours  cefTRIAXone Injectable 1000 milliGRAM(s) IV Push every 24 hours  digoxin     Tablet 0.125 milliGRAM(s) Oral daily  diltiazem    milliGRAM(s) Oral daily  famotidine    Tablet 20 milliGRAM(s) Oral daily  metoprolol     tartrate 25 milliGRAM(s) Oral every 8 hours    T(C): 36.3 (12-28-17 @ 06:43), Max: 36.8 (12-27-17 @ 18:15)  HR: 79 (12-28-17 @ 06:43) (76 - 98)  BP: 115/52 (12-28-17 @ 06:43) (102/53 - 127/68)  RR: 16 (12-28-17 @ 06:43) (16 - 19)  SpO2: 100% (12-28-17 @ 06:43) (97% - 100%)    PHYSICAL EXAM:  General apperance: Patient in no acute distress, Alert and Oriented x 2, " year = 1979, president = thalia "   	HEENT: No JVD, no cervical lymphadenopathy   	CVS: S1 S2 no murmurs, rubs or gallops   	Lung: DIminished Breath Sounds bilaterally   	Abdomen: Soft non tender non distended + BS   	Extremity: no lower extremity edema   	MSK: (+) contracture right 3rd and 4th digit.  Back: No spinal tenderness                      9.6    12.6  )-----------( 288      ( 28 Dec 2017 06:09 )             30.0     12-28    135  |  101  |  26<H>  ----------------------------<  79  3.7   |  27  |  0.50    Ca    8.0<L>      28 Dec 2017 06:09    TPro  7.3  /  Alb  2.0<L>  /  TBili  0.4  /  DBili  x   /  AST  24  /  ALT  39  /  AlkPhos  87  12-27

## 2017-12-29 NOTE — PROGRESS NOTE ADULT - SUBJECTIVE AND OBJECTIVE BOX
HPI:  Patient is 89-year-old female with a past medical history significant for atrial fibrillation admitted on  for evaluation of 2 weeks of fever and nonproductive cough associated with myalgia; of note she has burning with urination and currently is having chills, possibly rigors. Unclear if she had sick contacts; no other specific complaints.   Today  patient comfortable, still with cough    MEDICATIONS  (STANDING):  apixaban 2.5 milliGRAM(s) Oral every 12 hours  cefTRIAXone Injectable 1000 milliGRAM(s) IV Push every 24 hours  digoxin     Tablet 0.125 milliGRAM(s) Oral daily  diltiazem    milliGRAM(s) Oral daily  famotidine    Tablet 20 milliGRAM(s) Oral daily  influenza   Vaccine 0.5 milliLiter(s) IntraMuscular once  metoprolol     tartrate 25 milliGRAM(s) Oral every 8 hours    MEDICATIONS  (PRN):  acetaminophen   Tablet 650 milliGRAM(s) Oral every 6 hours PRN For Temp greater than 38.5 C (101.3 F)  guaiFENesin    Syrup 200 milliGRAM(s) Oral every 6 hours PRN Cough      Vital Signs Last 24 Hrs  T(C): 37.2 (29 Dec 2017 14:07), Max: 38.6 (29 Dec 2017 12:10)  T(F): 99 (29 Dec 2017 14:07), Max: 101.4 (29 Dec 2017 12:10)  HR: 87 (29 Dec 2017 12:10) (76 - 87)  BP: 138/67 (29 Dec 2017 12:10) (106/56 - 138/67)  BP(mean): --  RR: 18 (29 Dec 2017 12:10) (18 - 18)  SpO2: 98% (29 Dec 2017 12:10) (83% - 100%)    Physical Exam:        Daily   Constitutional: frail looking  HEENT: NC/AT, EOMI, PERRLA  Neck: supple  Respiratory: clear, no r/r/w  Cardiovascular: S1S2 regular, no murmurs  Abdomen: soft, not tender, not distended, positive BS  Genitourinary: deferred  Rectal: deferred  Musculoskeletal: no muscle tenderness, no joint swelling or tenderness  Neurological: AxOx3, moving all extremities, no focal deficits  Skin: no rashes               Labs:                        9.7    14.0  )-----------( 276      ( 29 Dec 2017 07:26 )             31.0         133<L>  |  102  |  20  ----------------------------<  99  3.6   |  23  |  0.42<L>    Ca    7.9<L>      29 Dec 2017 07:26    TPro  7.3  /  Alb  2.0<L>  /  TBili  0.4  /  DBili  x   /  AST  24  /  ALT  39  /  AlkPhos  87             Cultures:       Culture - Urine (collected 17 @ 19:37)  Source: .Urine Clean Catch (Midstream)  Preliminary Report (17 @ 19:06):    >100,000 CFU/ml Proteus mirabilis    Culture - Blood (collected 17 @ 19:37)  Source: .Blood Blood-Peripheral  Preliminary Report (17 @ 01:03):    No growth to date.    Culture - Blood (collected 17 @ 19:37)  Source: .Blood Blood-Peripheral  Preliminary Report (17 @ 01:03):    No growth to date.                     9.6    12.6  )-----------( 288      ( 28 Dec 2017 06:09 )             30.0         135  |  101  |  26<H>  ----------------------------<  79  3.7   |  27  |  0.50    Ca    8.0<L>      28 Dec 2017 06:09    TPro  7.3  /  Alb  2.0<L>  /  TBili  0.4  /  DBili  x   /  AST  24  /  ALT  39  /  AlkPhos  87       LIVER FUNCTIONS - ( 27 Dec 2017 19:37 )  Alb: 2.0 g/dL / Pro: 7.3 gm/dL / ALK PHOS: 87 U/L / ALT: 39 U/L / AST: 24 U/L / GGT: x           Urinalysis Basic - ( 27 Dec 2017 19:37 )    Color: Yellow / Appearance: Slightly Turbid / S.010 / pH: x  Gluc: x / Ketone: Negative  / Bili: Negative / Urobili: Negative mg/dL   Blood: x / Protein: 30 mg/dL / Nitrite: Negative   Leuk Esterase: Moderate / RBC: 6-10 /HPF / WBC >50   Sq Epi: x / Non Sq Epi: Few / Bacteria: Moderate    Rapid Respiratory Viral Panel (17 @ 20:47)    Rapid RVP Result: Detected: The FilmArray RVP Rapid uses polymerase chain reaction (PCR) and melt  curve analysis to screen for adenovirus; coronavirus HKU1, NL63, 229E,  OC43; human metapneumovirus (hMPV); human enterovirus/rhinovirus  (Entero/RV); influenza A; influenza A/H1;influenza A/H3; influenza  A/H1-2009; influenza B; parainfluenza viruses 1, 2, 3, 4; respiratory  syncytial virus; Bordetella pertussis; Mycoplasma pneumoniae; and  Chlamydophila pneumoniae.    hMPV (RapRVP): Detected: Test Name:rvp  Called by:marcial  Called to:rn k im  Read back 2 Pt IDs:y Read back values:y Date/Tm:17 22:21          Radiology:< from: Xray Chest 1 View AP/PA. (17 @ 21:56) >    EXAM:  CHEST SINGLE VIEW FRONTAL                            PROCEDURE DATE:  2017          INTERPRETATION:  Clinical information: Fever and cough. Rule out   pneumonia.    AP view of the chest    COMPARISON: None    FINDINGS: The heart appears enlarged although not well quantified due to   AP technique. There is no pleural effusion, lung consolidation or   pneumothorax.    IMPRESSION: Clear lungs.    < end of copied text >      Advanced directive addressed: full resuscitation

## 2017-12-29 NOTE — PROGRESS NOTE ADULT - ASSESSMENT
Patient is 89-year-old female with a past medical history significant for atrial fibrillation admitted on 12/27 for evaluation of 2 weeks of fever and nonproductive cough associated with myalgia; of note she has burning with urination and currently is having chills, possibly rigors. Unclear if she had sick contacts; no other specific complaints.   1.  Patient admitted with URI with human metapneumovirus  - no evidence of pneumonia on chest xray  - iv hydration and supportive care   - oxygen and nebs as needed   - serial cbc and monitor temperature   - contact and droplet isolation  2. Pyuria, rule out urinary tract infection  - day #2 ceftriaxone  - tolerating antibiotics without rashes or side effects   - follow up cultures   3. other issues: afib  - per medicine

## 2017-12-29 NOTE — PROGRESS NOTE ADULT - ASSESSMENT
89F.  admitted 12/27/17.  from [facility].  p/w cough x 2 weeks + fever.  also, c/o left wrist erythema and tenderness.    PMHx:  AF (E);  HF.    UTI.  sepsis upon admission.  -f/u Cx.  -c/w ceftriaxone.  -ID consult.    cough.  -(+) RVP, human metapneumovirus.  -supportive measures.    left wrist pain.  -xray c/w age-indeterminate avulsion fracture of the ulnar styloid and possibly pisiform bone.   -Plastic surgery consult.    hx AF.  -rate controlled.  -c/w Eliquis.  -c/w NDP-CCB + BB.    advanced directive.  -DNR.    DVT prophylaxis.  -Eliquis.    disposition.  -general medical abdi.    communication.  -d/w RN.  -d/w Case Management.

## 2017-12-30 LAB
ANION GAP SERPL CALC-SCNC: 6 MMOL/L — SIGNIFICANT CHANGE UP (ref 5–17)
BASOPHILS # BLD AUTO: 0.1 K/UL — SIGNIFICANT CHANGE UP (ref 0–0.2)
BUN SERPL-MCNC: 17 MG/DL — SIGNIFICANT CHANGE UP (ref 7–23)
CALCIUM SERPL-MCNC: 8.1 MG/DL — LOW (ref 8.5–10.1)
CHLORIDE SERPL-SCNC: 99 MMOL/L — SIGNIFICANT CHANGE UP (ref 96–108)
CO2 SERPL-SCNC: 27 MMOL/L — SIGNIFICANT CHANGE UP (ref 22–31)
CREAT SERPL-MCNC: 0.48 MG/DL — LOW (ref 0.5–1.3)
EOSINOPHIL # BLD AUTO: 0 K/UL — SIGNIFICANT CHANGE UP (ref 0–0.5)
GLUCOSE SERPL-MCNC: 104 MG/DL — HIGH (ref 70–99)
HCT VFR BLD CALC: 30.2 % — LOW (ref 34.5–45)
HGB BLD-MCNC: 9.7 G/DL — LOW (ref 11.5–15.5)
LACTATE SERPL-SCNC: 1.1 MMOL/L — SIGNIFICANT CHANGE UP (ref 0.7–2)
LYMPHOCYTES # BLD AUTO: 1.7 K/UL — SIGNIFICANT CHANGE UP (ref 1–3.3)
LYMPHOCYTES # BLD AUTO: 12 % — LOW (ref 13–44)
MANUAL DIF COMMENT BLD-IMP: SIGNIFICANT CHANGE UP
MCHC RBC-ENTMCNC: 26.3 PG — LOW (ref 27–34)
MCHC RBC-ENTMCNC: 32.1 GM/DL — SIGNIFICANT CHANGE UP (ref 32–36)
MCV RBC AUTO: 82.1 FL — SIGNIFICANT CHANGE UP (ref 80–100)
MONOCYTES # BLD AUTO: 6.9 K/UL — HIGH (ref 0–0.9)
MONOCYTES NFR BLD AUTO: 38 % — HIGH (ref 2–14)
NEUTROPHILS # BLD AUTO: 10 K/UL — HIGH (ref 1.8–7.4)
NEUTROPHILS NFR BLD AUTO: 50 % — SIGNIFICANT CHANGE UP (ref 43–77)
PLAT MORPH BLD: NORMAL — SIGNIFICANT CHANGE UP
PLATELET # BLD AUTO: 292 K/UL — SIGNIFICANT CHANGE UP (ref 150–400)
POTASSIUM SERPL-MCNC: 3.7 MMOL/L — SIGNIFICANT CHANGE UP (ref 3.5–5.3)
POTASSIUM SERPL-SCNC: 3.7 MMOL/L — SIGNIFICANT CHANGE UP (ref 3.5–5.3)
RBC # BLD: 3.67 M/UL — LOW (ref 3.8–5.2)
RBC # FLD: 17.9 % — HIGH (ref 10.3–14.5)
RBC BLD AUTO: SIGNIFICANT CHANGE UP
SODIUM SERPL-SCNC: 132 MMOL/L — LOW (ref 135–145)
WBC # BLD: 18.7 K/UL — HIGH (ref 3.8–10.5)
WBC # FLD AUTO: 18.7 K/UL — HIGH (ref 3.8–10.5)

## 2017-12-30 PROCEDURE — 73080 X-RAY EXAM OF ELBOW: CPT | Mod: 26,LT

## 2017-12-30 PROCEDURE — 73100 X-RAY EXAM OF WRIST: CPT | Mod: 26

## 2017-12-30 RX ORDER — SODIUM CHLORIDE 9 MG/ML
1000 INJECTION INTRAMUSCULAR; INTRAVENOUS; SUBCUTANEOUS
Qty: 0 | Refills: 0 | Status: DISCONTINUED | OUTPATIENT
Start: 2017-12-30 | End: 2017-12-31

## 2017-12-30 RX ADMIN — Medication 25 MILLIGRAM(S): at 06:00

## 2017-12-30 RX ADMIN — FAMOTIDINE 20 MILLIGRAM(S): 10 INJECTION INTRAVENOUS at 11:11

## 2017-12-30 RX ADMIN — CEFTRIAXONE 1000 MILLIGRAM(S): 500 INJECTION, POWDER, FOR SOLUTION INTRAMUSCULAR; INTRAVENOUS at 21:46

## 2017-12-30 RX ADMIN — Medication 25 MILLIGRAM(S): at 21:45

## 2017-12-30 RX ADMIN — Medication 650 MILLIGRAM(S): at 14:13

## 2017-12-30 RX ADMIN — Medication 25 MILLIGRAM(S): at 14:24

## 2017-12-30 RX ADMIN — APIXABAN 2.5 MILLIGRAM(S): 2.5 TABLET, FILM COATED ORAL at 17:25

## 2017-12-30 RX ADMIN — SODIUM CHLORIDE 50 MILLILITER(S): 9 INJECTION INTRAMUSCULAR; INTRAVENOUS; SUBCUTANEOUS at 17:26

## 2017-12-30 RX ADMIN — APIXABAN 2.5 MILLIGRAM(S): 2.5 TABLET, FILM COATED ORAL at 06:00

## 2017-12-30 RX ADMIN — Medication 0.12 MILLIGRAM(S): at 06:00

## 2017-12-30 RX ADMIN — Medication 120 MILLIGRAM(S): at 06:00

## 2017-12-30 NOTE — PROGRESS NOTE ADULT - SUBJECTIVE AND OBJECTIVE BOX
HPI:  Patient is 89-year-old female with a past medical history significant for atrial fibrillation admitted on  for evaluation of 2 weeks of fever and nonproductive cough associated with myalgia; of note she has burning with urination and currently is having chills, possibly rigors. Unclear if she had sick contacts; no other specific complaints.   Today  patient comfortable, still with cough  Today  patient still coughing    MEDICATIONS  (STANDING):  apixaban 2.5 milliGRAM(s) Oral every 12 hours  cefTRIAXone Injectable 1000 milliGRAM(s) IV Push every 24 hours  digoxin     Tablet 0.125 milliGRAM(s) Oral daily  diltiazem    milliGRAM(s) Oral daily  famotidine    Tablet 20 milliGRAM(s) Oral daily  influenza   Vaccine 0.5 milliLiter(s) IntraMuscular once  metoprolol     tartrate 25 milliGRAM(s) Oral every 8 hours  sodium chloride 0.9%. 1000 milliLiter(s) (50 mL/Hr) IV Continuous <Continuous>    MEDICATIONS  (PRN):  acetaminophen   Tablet 650 milliGRAM(s) Oral every 6 hours PRN For Temp greater than 38.5 C (101.3 F)  guaiFENesin    Syrup 200 milliGRAM(s) Oral every 6 hours PRN Cough      Vital Signs Last 24 Hrs  T(C): 37.2 (30 Dec 2017 11:43), Max: 39 (29 Dec 2017 23:43)  T(F): 99 (30 Dec 2017 11:43), Max: 102.2 (29 Dec 2017 23:43)  HR: 72 (30 Dec 2017 11:43) (72 - 94)  BP: 124/54 (30 Dec 2017 11:43) (107/58 - 129/50)  BP(mean): --  RR: 20 (30 Dec 2017 11:43) (18 - 28)  SpO2: 97% (30 Dec 2017 11:43) (97% - 100%)    Physical Exam:        Daily   Constitutional: frail looking  HEENT: NC/AT, EOMI, PERRLA  Neck: supple  Respiratory: clear, no r/r/w  Cardiovascular: S1S2 regular, no murmurs  Abdomen: soft, not tender, not distended, positive BS  Genitourinary: deferred  Rectal: deferred  Musculoskeletal: no muscle tenderness, no joint swelling or tenderness  Neurological: AxOx3, moving all extremities, no focal deficits  Skin: no rashes               Labs:    Labs:                        9.7    18.7  )-----------( 292      ( 30 Dec 2017 08:01 )             30.2     12-    132<L>  |  99  |  17  ----------------------------<  104<H>  3.7   |  27  |  0.48<L>    Ca    8.1<L>      30 Dec 2017 08:01             Cultures:       Culture - Urine (collected 17 @ 19:37)  Source: .Urine Clean Catch (Midstream)  Final Report (17 @ 18:42):    >100,000 CFU/ml Proteus mirabilis  Organism: Proteus mirabilis (17 @ 18:42)  Organism: Proteus mirabilis (17 @ 18:42)      -  Amikacin: S <=8      -  Ampicillin: S <=2      -  Ampicillin/Sulbactam: S <=4/2      -  Aztreonam: S <=4      -  Cefazolin: S <=2      -  Cefepime: S <=2      -  Cefoxitin: S <=4      -  Ceftazidime: S <=1      -  Ceftriaxone: S <=1      -  Ciprofloxacin: S <=0.5      -  Ertapenem: S <=0.5      -  Gentamicin: S <=1      -  Levofloxacin: S <=1      -  Meropenem: S <=1      -  Nitrofurantoin: R >64      -  Piperacillin/Tazobactam: S <=8      -  Tobramycin: S <=2      -  Trimethoprim/Sulfamethoxazole: S <=0.5/9.5      Method Type: AGUILA    Culture - Blood (collected 17 @ 19:37)  Source: .Blood Blood-Peripheral  Preliminary Report (17 @ 01:03):    No growth to date.    Culture - Blood (collected 17 @ 19:37)  Source: .Blood Blood-Peripheral  Preliminary Report (17 @ 01:03):    No growth to date.                                9.7    14.0  )-----------( 276      ( 29 Dec 2017 07:26 )             31.0         133<L>  |  102  |  20  ----------------------------<  99  3.6   |  23  |  0.42<L>    Ca    7.9<L>      29 Dec 2017 07:26    TPro  7.3  /  Alb  2.0<L>  /  TBili  0.4  /  DBili  x   /  AST  24  /  ALT  39  /  AlkPhos  87                          9.6    12.6  )-----------( 288      ( 28 Dec 2017 06:09 )             30.0         135  |  101  |  26<H>  ----------------------------<  79  3.7   |  27  |  0.50    Ca    8.0<L>      28 Dec 2017 06:09    TPro  7.3  /  Alb  2.0<L>  /  TBili  0.4  /  DBili  x   /  AST  24  /  ALT  39  /  AlkPhos  87       LIVER FUNCTIONS - ( 27 Dec 2017 19:37 )  Alb: 2.0 g/dL / Pro: 7.3 gm/dL / ALK PHOS: 87 U/L / ALT: 39 U/L / AST: 24 U/L / GGT: x           Urinalysis Basic - ( 27 Dec 2017 19:37 )    Color: Yellow / Appearance: Slightly Turbid / S.010 / pH: x  Gluc: x / Ketone: Negative  / Bili: Negative / Urobili: Negative mg/dL   Blood: x / Protein: 30 mg/dL / Nitrite: Negative   Leuk Esterase: Moderate / RBC: 6-10 /HPF / WBC >50   Sq Epi: x / Non Sq Epi: Few / Bacteria: Moderate    Rapid Respiratory Viral Panel (12.27.17 @ 20:47)    Rapid RVP Result: Detected: The FilmArray RVP Rapid uses polymerase chain reaction (PCR) and melt  curve analysis to screen for adenovirus; coronavirus HKU1, NL63, 229E,  OC43; human metapneumovirus (hMPV); human enterovirus/rhinovirus  (Entero/RV); influenza A; influenza A/H1;influenza A/H3; influenza  A/H1-2009; influenza B; parainfluenza viruses 1, 2, 3, 4; respiratory  syncytial virus; Bordetella pertussis; Mycoplasma pneumoniae; and  Chlamydophila pneumoniae.    hMPV (RapRVP): Detected: Test Name:rvp  Called by:marcial  Called to:rn k im  Read back 2 Pt IDs:y Read back values:y Date/Tm:17 22:21          Radiology:< from: Xray Chest 1 View AP/PA. (17 @ 21:56) >    EXAM:  CHEST SINGLE VIEW FRONTAL                            PROCEDURE DATE:  2017          INTERPRETATION:  Clinical information: Fever and cough. Rule out   pneumonia.    AP view of the chest    COMPARISON: None    FINDINGS: The heart appears enlarged although not well quantified due to   AP technique. There is no pleural effusion, lung consolidation or   pneumothorax.    IMPRESSION: Clear lungs.    < end of copied text >      Advanced directive addressed: full resuscitation

## 2017-12-30 NOTE — PROGRESS NOTE ADULT - SUBJECTIVE AND OBJECTIVE BOX
CC:  Patient is a 89y old  Female who presents with a chief complaint of cough (28 Dec 2017 01:30)    SUBJECTIVE:  offer no new complaints.    ROS:  unchanged.    acetaminophen   Tablet 650 milliGRAM(s) Oral every 6 hours PRN  apixaban 2.5 milliGRAM(s) Oral every 12 hours  cefTRIAXone Injectable 1000 milliGRAM(s) IV Push every 24 hours  digoxin     Tablet 0.125 milliGRAM(s) Oral daily  diltiazem    milliGRAM(s) Oral daily  famotidine    Tablet 20 milliGRAM(s) Oral daily  guaiFENesin    Syrup 200 milliGRAM(s) Oral every 6 hours PRN  influenza   Vaccine 0.5 milliLiter(s) IntraMuscular once  metoprolol     tartrate 25 milliGRAM(s) Oral every 8 hours    T(C): 37.2 (12-30-17 @ 11:43), Max: 39 (12-29-17 @ 23:43)  HR: 72 (12-30-17 @ 11:43) (72 - 94)  BP: 124/54 (12-30-17 @ 11:43) (107/58 - 129/50)  RR: 20 (12-30-17 @ 11:43) (18 - 28)  SpO2: 97% (12-30-17 @ 11:43) (97% - 100%)    PHYSICAL EXAM:  General apperance: Patient in no acute distress.	  HEENT: No JVD, no cervical lymphadenopathy   CVS: S1 S2 no murmurs, rubs or gallops   Lung: DIminished Breath Sounds bilaterally   Abdomen: Soft non tender non distended + BS   Extremity: no lower extremity edema   MSK: (+) contracture right 3rd and 4th digit.  Back: No spinal tenderness                                 9.7    18.7  )-----------( 292      ( 30 Dec 2017 08:01 )             30.2     12-30    132<L>  |  99  |  17  ----------------------------<  104<H>  3.7   |  27  |  0.48<L>    Ca    8.1<L>      30 Dec 2017 08:01

## 2017-12-30 NOTE — PROGRESS NOTE ADULT - ASSESSMENT
89F.  admitted 12/27/17.  from [facility].  p/w cough x 2 weeks + fever.  also, c/o left wrist erythema and tenderness.    PMHx:  AF (E);  HF.    UTI.  fever.  leukocytosis.  -f/u AM BMP.  -UCx, (+) 10K Proteus.  -c/w ceftriaxone.  -ID input noted..    cough.  -(+) RVP, human metapneumovirus.  -supportive measures.    left wrist pain.  -xray c/w age-indeterminate avulsion fracture of the ulnar styloid and possibly pisiform bone.   -Plastic surgery input noted.    hx AF.  -rate controlled.  -c/w Eliquis.  -c/w NDP-CCB + BB.    advanced directive.  -DNR.    DVT prophylaxis.  -Eliquis.    disposition.  -general medical abdi.    communication.  -d/w RN. 89F.  admitted 12/27/17.  from [facility].  p/w cough x 2 weeks + fever.  also, c/o left wrist erythema and tenderness.    PMHx:  AF (E);  HF.    UTI.  fever.  leukocytosis.  -f/u AM CBC.  -UCx, (+) 10K Proteus.  -c/w ceftriaxone pending sensitivities.  -ID input noted.    cough.  -(+) RVP, human metapneumovirus.  -supportive measures.    hyponatremia.  fever.  -start IVFs, 0.9%NS @ 50mL/hr.  -f/u AM BMP.    left wrist pain.  -xray c/w age-indeterminate avulsion fracture of the ulnar styloid and possibly pisiform bone.   -Plastic surgery input noted.    hx AF.  -rate controlled.  -c/w Eliquis.  -c/w NDP-CCB + BB.    advanced directive.  -DNR.    DVT prophylaxis.  -Eliquis.    disposition.  -general medical abdi.    communication.  -d/w RN.

## 2017-12-30 NOTE — CONSULT NOTE ADULT - ASSESSMENT
A/P: 89y Female with left wrist pain likely secondary to inflammatory arthropathy  Chondrocalcinosis on XR of L wrist and clinical exam suggests possible pseudogout  Recommend Pain control  Recommend NSAIDs/anti-inflammatories or prednisone if patient can tolerate medically - per primary team  Can wear cockup wrist splint for comfort as needed  Ice/elevation   WBAT LUE  FU Left elbow xray  Continue medical management  No acute orthopedic surgical intervention at this time  Can follow-up with Dr. Laird in office as outpatient as needed, call office for appointment  Ortho stable

## 2017-12-30 NOTE — PROGRESS NOTE ADULT - ASSESSMENT
Patient is 89-year-old female with a past medical history significant for atrial fibrillation admitted on 12/27 for evaluation of 2 weeks of fever and nonproductive cough associated with myalgia; of note she has burning with urination and currently is having chills, possibly rigors. Unclear if she had sick contacts; no other specific complaints.   1.  Patient admitted with URI with human metapneumovirus  - no evidence of pneumonia on chest xray  - iv hydration and supportive care   - oxygen and nebs as needed   - serial cbc and monitor temperature   - contact and droplet isolation  2. Pyuria, rule out urinary tract infection  - found to have Proteus in urine  - will continue ceftriaxone one more day  - day #3 ceftriaxone  - tolerating antibiotics without rashes or side effects   - follow up cultures   3. other issues: afib  - per medicine

## 2017-12-30 NOTE — CONSULT NOTE ADULT - SUBJECTIVE AND OBJECTIVE BOX
89y Female RHD presents w/ pain of Left wrist and elbow that she says has been bothering her for "a long time".  Reports no new changes in symptoms. Currently admitted with urosepsis upon admission.  Denies trauma -- reports that she injured wrist several years ago. Denies numbness/tingling. Denies pain/injury elsewhere. No other complaints.    PAST MEDICAL & SURGICAL HISTORY:  H/O total hysterectomy  History of total right hip replacement: 20yrs ago    MEDICATIONS  (STANDING):  apixaban 2.5 milliGRAM(s) Oral every 12 hours  cefTRIAXone Injectable 1000 milliGRAM(s) IV Push every 24 hours  digoxin     Tablet 0.125 milliGRAM(s) Oral daily  diltiazem    milliGRAM(s) Oral daily  famotidine    Tablet 20 milliGRAM(s) Oral daily  influenza   Vaccine 0.5 milliLiter(s) IntraMuscular once  metoprolol     tartrate 25 milliGRAM(s) Oral every 8 hours    Allergies    No Known Allergies    Intolerances                            9.7    18.7  )-----------( 292      ( 30 Dec 2017 08:01 )             30.2     30 Dec 2017 08:01    132    |  99     |  17     ----------------------------<  104    3.7     |  27     |  0.48     Ca    8.1        30 Dec 2017 08:01        Vital Signs Last 24 Hrs  T(C): 37.2 (12-30-17 @ 11:43), Max: 39 (12-29-17 @ 23:43)  T(F): 99 (12-30-17 @ 11:43), Max: 102.2 (12-29-17 @ 23:43)  HR: 72 (12-30-17 @ 11:43) (72 - 94)  BP: 124/54 (12-30-17 @ 11:43) (107/58 - 129/50)  BP(mean): --  RR: 20 (12-30-17 @ 11:43) (18 - 28)  SpO2: 97% (12-30-17 @ 11:43) (97% - 100%)    Imaging:  L Wrist XR demonstrates age-indeterminate ulnar styloid fracture and chondrocalcinosis  L Elbow XR pending    Physical Exam  Gen: NAD, lying comfortably in bed  LUE: skin intact over L wrist and elbow, no swelling/erythema/redness/warmth, no ecchymosis, +TTP over left wrist and elbow, wrist active ROM 15 deg wrist extension to 15 deg wrist flexion, painful PROM beyond that, +AIN/PIN/M/R/U, +radial pulse, warm/well perfused, SILT C5-T1    Secondary Survey: No TTP over bony prominences, SILT, palpable pulses, full/painless range of motion, compartments soft

## 2017-12-31 LAB
ANION GAP SERPL CALC-SCNC: 10 MMOL/L — SIGNIFICANT CHANGE UP (ref 5–17)
BUN SERPL-MCNC: 17 MG/DL — SIGNIFICANT CHANGE UP (ref 7–23)
CALCIUM SERPL-MCNC: 7.7 MG/DL — LOW (ref 8.5–10.1)
CHLORIDE SERPL-SCNC: 103 MMOL/L — SIGNIFICANT CHANGE UP (ref 96–108)
CO2 SERPL-SCNC: 25 MMOL/L — SIGNIFICANT CHANGE UP (ref 22–31)
CREAT SERPL-MCNC: 0.37 MG/DL — LOW (ref 0.5–1.3)
GLUCOSE SERPL-MCNC: 88 MG/DL — SIGNIFICANT CHANGE UP (ref 70–99)
HCT VFR BLD CALC: 27.9 % — LOW (ref 34.5–45)
HGB BLD-MCNC: 8.7 G/DL — LOW (ref 11.5–15.5)
MCHC RBC-ENTMCNC: 26.2 PG — LOW (ref 27–34)
MCHC RBC-ENTMCNC: 31.4 GM/DL — LOW (ref 32–36)
MCV RBC AUTO: 83.4 FL — SIGNIFICANT CHANGE UP (ref 80–100)
PLATELET # BLD AUTO: 299 K/UL — SIGNIFICANT CHANGE UP (ref 150–400)
POTASSIUM SERPL-MCNC: 3.5 MMOL/L — SIGNIFICANT CHANGE UP (ref 3.5–5.3)
POTASSIUM SERPL-SCNC: 3.5 MMOL/L — SIGNIFICANT CHANGE UP (ref 3.5–5.3)
RBC # BLD: 3.34 M/UL — LOW (ref 3.8–5.2)
RBC # FLD: 17.8 % — HIGH (ref 10.3–14.5)
SODIUM SERPL-SCNC: 138 MMOL/L — SIGNIFICANT CHANGE UP (ref 135–145)
WBC # BLD: 11.4 K/UL — HIGH (ref 3.8–10.5)
WBC # FLD AUTO: 11.4 K/UL — HIGH (ref 3.8–10.5)

## 2017-12-31 RX ADMIN — FAMOTIDINE 20 MILLIGRAM(S): 10 INJECTION INTRAVENOUS at 14:29

## 2017-12-31 RX ADMIN — Medication 0.12 MILLIGRAM(S): at 06:56

## 2017-12-31 RX ADMIN — Medication 25 MILLIGRAM(S): at 17:05

## 2017-12-31 RX ADMIN — APIXABAN 2.5 MILLIGRAM(S): 2.5 TABLET, FILM COATED ORAL at 17:05

## 2017-12-31 RX ADMIN — Medication 650 MILLIGRAM(S): at 17:03

## 2017-12-31 RX ADMIN — Medication 25 MILLIGRAM(S): at 21:33

## 2017-12-31 RX ADMIN — Medication 25 MILLIGRAM(S): at 06:56

## 2017-12-31 RX ADMIN — SODIUM CHLORIDE 50 MILLILITER(S): 9 INJECTION INTRAMUSCULAR; INTRAVENOUS; SUBCUTANEOUS at 10:01

## 2017-12-31 RX ADMIN — APIXABAN 2.5 MILLIGRAM(S): 2.5 TABLET, FILM COATED ORAL at 06:56

## 2017-12-31 RX ADMIN — Medication 120 MILLIGRAM(S): at 06:56

## 2017-12-31 NOTE — PROGRESS NOTE ADULT - ASSESSMENT
Patient is 89-year-old female with a past medical history significant for atrial fibrillation admitted on 12/27 for evaluation of 2 weeks of fever and nonproductive cough associated with myalgia; of note she has burning with urination and currently is having chills, possibly rigors. Unclear if she had sick contacts; no other specific complaints.   1.  Patient admitted with URI with human metapneumovirus  - no evidence of pneumonia on chest xray  - iv hydration and supportive care   - oxygen and nebs as needed   - serial cbc and monitor temperature   - contact and droplet isolation  2. Pyuria, rule out urinary tract infection  - found to have Proteus in urine  - will continue ceftriaxone one more day  - day #4 ceftriaxone  - tolerating antibiotics without rashes or side effects   - will stop antibiotics today  3. other issues: afib  - per medicine  If further ID issues please reconsult

## 2017-12-31 NOTE — PROGRESS NOTE ADULT - ASSESSMENT
89F.  admitted 12/27/17.  from [facility].  p/w cough x 2 weeks + fever.  also, c/o left wrist erythema and tenderness.    PMHx:  AF (E);  HF.    UTI.  fever.  leukocytosis.  -f/u AM CBC.  -UCx, (+) 100K Proteus.  -d/c ABx today.  -ID input noted.    cough.  -(+) RVP, human metapneumovirus.  -supportive measures.    normocytic anemia.  -f/u AM CBC.  -stool OB, Fe studies, B12, folate and reticulocyte count.    hyponatremia.  -resolved.  -d/c IVFs.  -f/u AM BMP.    left wrist pain.  -xray c/w age-indeterminate avulsion fracture of the ulnar styloid and possibly pisiform bone.   -Plastic surgery input noted.    hx AF.  -rate controlled.  -c/w Eliquis.  -c/w NDP-CCB + BB.    advanced directive.  -DNR.    DVT prophylaxis.  -Eliquis.    disposition.  -general medical abdi.  -mobilize/PT evaluation for RACHEL.    communication.  -d/w RN.

## 2017-12-31 NOTE — PROGRESS NOTE ADULT - SUBJECTIVE AND OBJECTIVE BOX
HPI:  Patient is 89-year-old female with a past medical history significant for atrial fibrillation admitted on  for evaluation of 2 weeks of fever and nonproductive cough associated with myalgia; of note she has burning with urination and currently is having chills, possibly rigors. Unclear if she had sick contacts; no other specific complaints.   Today  patient comfortable, still with cough  Today  patient still coughing  Today  patient comfortable    MEDICATIONS  (STANDING):  apixaban 2.5 milliGRAM(s) Oral every 12 hours  cefTRIAXone Injectable 1000 milliGRAM(s) IV Push every 24 hours  digoxin     Tablet 0.125 milliGRAM(s) Oral daily  diltiazem    milliGRAM(s) Oral daily  famotidine    Tablet 20 milliGRAM(s) Oral daily  influenza   Vaccine 0.5 milliLiter(s) IntraMuscular once  metoprolol     tartrate 25 milliGRAM(s) Oral every 8 hours  sodium chloride 0.9%. 1000 milliLiter(s) (50 mL/Hr) IV Continuous <Continuous>    MEDICATIONS  (PRN):  acetaminophen   Tablet 650 milliGRAM(s) Oral every 6 hours PRN For Temp greater than 38.5 C (101.3 F)  guaiFENesin    Syrup 200 milliGRAM(s) Oral every 6 hours PRN Cough      Vital Signs Last 24 Hrs  T(C): 36.9 (31 Dec 2017 11:08), Max: 38.5 (30 Dec 2017 17:20)  T(F): 98.4 (31 Dec 2017 11:08), Max: 101.3 (30 Dec 2017 17:20)  HR: 71 (31 Dec 2017 11:08) (71 - 88)  BP: 106/47 (31 Dec 2017 11:08) (106/47 - 124/51)  BP(mean): --  RR: 16 (31 Dec 2017 11:08) (16 - 20)  SpO2: 99% (31 Dec 2017 11:08) (98% - 100%)    Physical Exam:          Daily   Constitutional: frail looking  HEENT: NC/AT, EOMI, PERRLA  Neck: supple  Respiratory: clear, no r/r/w  Cardiovascular: S1S2 regular, no murmurs  Abdomen: soft, not tender, not distended, positive BS  Genitourinary: deferred  Rectal: deferred  Musculoskeletal: no muscle tenderness, no joint swelling or tenderness  Neurological: AxOx3, moving all extremities, no focal deficits  Skin: no rashes               Labs:    Labs:                        9.7    18.7  )-----------( 292      ( 30 Dec 2017 08:01 )             30.2     12    132<L>  |  99  |  17  ----------------------------<  104<H>  3.7   |  27  |  0.48<L>    Ca    8.1<L>      30 Dec 2017 08:01             Cultures:       Culture - Urine (collected 17 @ 19:37)  Source: .Urine Clean Catch (Midstream)  Final Report (17 @ 18:42):    >100,000 CFU/ml Proteus mirabilis  Organism: Proteus mirabilis (17 @ 18:42)  Organism: Proteus mirabilis (17 @ 18:42)      -  Amikacin: S <=8      -  Ampicillin: S <=2      -  Ampicillin/Sulbactam: S <=4/2      -  Aztreonam: S <=4      -  Cefazolin: S <=2      -  Cefepime: S <=2      -  Cefoxitin: S <=4      -  Ceftazidime: S <=1      -  Ceftriaxone: S <=1      -  Ciprofloxacin: S <=0.5      -  Ertapenem: S <=0.5      -  Gentamicin: S <=1      -  Levofloxacin: S <=1      -  Meropenem: S <=1      -  Nitrofurantoin: R >64      -  Piperacillin/Tazobactam: S <=8      -  Tobramycin: S <=2      -  Trimethoprim/Sulfamethoxazole: S <=0.5/9.5      Method Type: AGUILA    Culture - Blood (collected 17 @ 19:37)  Source: .Blood Blood-Peripheral  Preliminary Report (17 @ 01:03):    No growth to date.    Culture - Blood (collected 17 @ 19:37)  Source: .Blood Blood-Peripheral  Preliminary Report (17 @ 01:03):    No growth to date.                                9.7    14.0  )-----------( 276      ( 29 Dec 2017 07:26 )             31.0         133<L>  |  102  |  20  ----------------------------<  99  3.6   |  23  |  0.42<L>    Ca    7.9<L>      29 Dec 2017 07:26    TPro  7.3  /  Alb  2.0<L>  /  TBili  0.4  /  DBili  x   /  AST  24  /  ALT  39  /  AlkPhos  87                          9.6    12.6  )-----------( 288      ( 28 Dec 2017 06:09 )             30.0     12    135  |  101  |  26<H>  ----------------------------<  79  3.7   |  27  |  0.50    Ca    8.0<L>      28 Dec 2017 06:09    TPro  7.3  /  Alb  2.0<L>  /  TBili  0.4  /  DBili  x   /  AST  24  /  ALT  39  /  AlkPhos  87       LIVER FUNCTIONS - ( 27 Dec 2017 19:37 )  Alb: 2.0 g/dL / Pro: 7.3 gm/dL / ALK PHOS: 87 U/L / ALT: 39 U/L / AST: 24 U/L / GGT: x           Urinalysis Basic - ( 27 Dec 2017 19:37 )    Color: Yellow / Appearance: Slightly Turbid / S.010 / pH: x  Gluc: x / Ketone: Negative  / Bili: Negative / Urobili: Negative mg/dL   Blood: x / Protein: 30 mg/dL / Nitrite: Negative   Leuk Esterase: Moderate / RBC: 6-10 /HPF / WBC >50   Sq Epi: x / Non Sq Epi: Few / Bacteria: Moderate    Rapid Respiratory Viral Panel (.27.17 @ 20:47)    Rapid RVP Result: Detected: The [employer] RVP Rapid uses polymerase chain reaction (PCR) and melt  curve analysis to screen for adenovirus; coronavirus HKU1, NL63, 229E,  OC43; human metapneumovirus (hMPV); human enterovirus/rhinovirus  (Entero/RV); influenza A; influenza A/H1;influenza A/H3; influenza  A/H1-2009; influenza B; parainfluenza viruses 1, 2, 3, 4; respiratory  syncytial virus; Bordetella pertussis; Mycoplasma pneumoniae; and  Chlamydophila pneumoniae.    hMPV (RapRVP): Detected: Test Name:rvp  Called by:marcial  Called to:rn k im  Read back 2 Pt IDs:y Read back values:y Date/Tm:17 22:21          Radiology:< from: Xray Chest 1 View AP/PA. (17 @ 21:56) >    EXAM:  CHEST SINGLE VIEW FRONTAL                            PROCEDURE DATE:  2017          INTERPRETATION:  Clinical information: Fever and cough. Rule out   pneumonia.    AP view of the chest    COMPARISON: None    FINDINGS: The heart appears enlarged although not well quantified due to   AP technique. There is no pleural effusion, lung consolidation or   pneumothorax.    IMPRESSION: Clear lungs.    < end of copied text >      Advanced directive addressed: full resuscitation

## 2017-12-31 NOTE — PROGRESS NOTE ADULT - SUBJECTIVE AND OBJECTIVE BOX
CC:  Patient is a 89y old  Female who presents with a chief complaint of cough (28 Dec 2017 01:30)    SUBJECTIVE:  comfortable.  offers no new complaints.    ROS:  (-) cough.    acetaminophen   Tablet 650 milliGRAM(s) Oral every 6 hours PRN  apixaban 2.5 milliGRAM(s) Oral every 12 hours  digoxin     Tablet 0.125 milliGRAM(s) Oral daily  diltiazem    milliGRAM(s) Oral daily  famotidine    Tablet 20 milliGRAM(s) Oral daily  guaiFENesin    Syrup 200 milliGRAM(s) Oral every 6 hours PRN  influenza   Vaccine 0.5 milliLiter(s) IntraMuscular once  metoprolol     tartrate 25 milliGRAM(s) Oral every 8 hours    T(C): 37.8 (12-31-17 @ 14:13), Max: 38.5 (12-30-17 @ 17:20)  HR: 71 (12-31-17 @ 11:08) (71 - 88)  BP: 106/47 (12-31-17 @ 11:08) (106/47 - 124/51)  RR: 16 (12-31-17 @ 11:08) (16 - 20)  SpO2: 99% (12-31-17 @ 11:08) (98% - 100%)    PHYSICAL EXAM:  General apperance: Patient in no acute distress.	  HEENT: No JVD, no cervical lymphadenopathy   CVS: S1 S2 no murmurs, rubs or gallops   Lung: DIminished Breath Sounds bilaterally   Abdomen: Soft non tender non distended + BS   Extremity: no lower extremity edema   MSK: (+) contracture right 3rd and 4th digit.  Back: No spinal tenderness                                 8.7    11.4  )-----------( 299      ( 31 Dec 2017 07:35 )             27.9     12-31    138  |  103  |  17  ----------------------------<  88  3.5   |  25  |  0.37<L>    Ca    7.7<L>      31 Dec 2017 07:35

## 2018-01-01 LAB
FERRITIN SERPL-MCNC: 1297 NG/ML — HIGH (ref 15–150)
FOLATE SERPL-MCNC: 11.3 NG/ML — SIGNIFICANT CHANGE UP (ref 4.8–24.2)
HCT VFR BLD CALC: 28.5 % — LOW (ref 34.5–45)
HGB BLD-MCNC: 8.9 G/DL — LOW (ref 11.5–15.5)
IRON SATN MFR SERPL: 39 % — SIGNIFICANT CHANGE UP (ref 14–50)
IRON SATN MFR SERPL: 48 UG/DL — SIGNIFICANT CHANGE UP (ref 30–160)
MCHC RBC-ENTMCNC: 26 PG — LOW (ref 27–34)
MCHC RBC-ENTMCNC: 31.2 GM/DL — LOW (ref 32–36)
MCV RBC AUTO: 83.4 FL — SIGNIFICANT CHANGE UP (ref 80–100)
PLATELET # BLD AUTO: 329 K/UL — SIGNIFICANT CHANGE UP (ref 150–400)
RBC # BLD: 3.35 M/UL — LOW (ref 3.8–5.2)
RBC # BLD: 3.42 M/UL — LOW (ref 3.8–5.2)
RBC # FLD: 18.1 % — HIGH (ref 10.3–14.5)
RETICS #: 73 K/UL — SIGNIFICANT CHANGE UP (ref 25–125)
RETICS/RBC NFR: 2.2 % — SIGNIFICANT CHANGE UP (ref 0.5–2.5)
TIBC SERPL-MCNC: 123 UG/DL — LOW (ref 220–430)
UIBC SERPL-MCNC: 75 UG/DL — LOW (ref 110–370)
VIT B12 SERPL-MCNC: 970 PG/ML — SIGNIFICANT CHANGE UP (ref 232–1245)
WBC # BLD: 8 K/UL — SIGNIFICANT CHANGE UP (ref 3.8–10.5)
WBC # FLD AUTO: 8 K/UL — SIGNIFICANT CHANGE UP (ref 3.8–10.5)

## 2018-01-01 RX ADMIN — APIXABAN 2.5 MILLIGRAM(S): 2.5 TABLET, FILM COATED ORAL at 05:32

## 2018-01-01 RX ADMIN — Medication 25 MILLIGRAM(S): at 14:26

## 2018-01-01 RX ADMIN — APIXABAN 2.5 MILLIGRAM(S): 2.5 TABLET, FILM COATED ORAL at 17:29

## 2018-01-01 RX ADMIN — Medication 0.12 MILLIGRAM(S): at 05:32

## 2018-01-01 RX ADMIN — Medication 25 MILLIGRAM(S): at 21:00

## 2018-01-01 RX ADMIN — Medication 650 MILLIGRAM(S): at 20:59

## 2018-01-01 RX ADMIN — Medication 25 MILLIGRAM(S): at 05:32

## 2018-01-01 RX ADMIN — Medication 120 MILLIGRAM(S): at 05:32

## 2018-01-01 RX ADMIN — FAMOTIDINE 20 MILLIGRAM(S): 10 INJECTION INTRAVENOUS at 11:01

## 2018-01-01 NOTE — PROGRESS NOTE ADULT - ASSESSMENT
89F.  admitted 12/27/17.  from [facility].  p/w cough x 2 weeks + fever.  also, c/o left wrist erythema and tenderness.    PMHx:  AF (E);  HF.    UTI.  fever.  leukocytosis.  -f/u AM CBC.  -UCx, (+) 100K Proteus.  -d/c'd ABx.  -ID input noted.    cough.  -(+) RVP, human metapneumovirus.  -supportive measures.    normocytic anemia.  -f/u AM CBC.  -stool OB, Fe studies, B12, folate and reticulocyte count.    hyponatremia.  -resolved.  -d/c IVFs.  -f/u AM BMP.    left wrist pain.  -xray c/w age-indeterminate avulsion fracture of the ulnar styloid and possibly pisiform bone.   -Plastic surgery input noted.    hx AF.  -rate controlled.  -c/w Eliquis.  -c/w NDP-CCB + BB.    advanced directive.  -DNR.    DVT prophylaxis.  -Eliquis.    disposition.  -general medical abdi.  -mobilize/PT evaluation for [facility].    communication.  -d/w RN.

## 2018-01-01 NOTE — PROGRESS NOTE ADULT - SUBJECTIVE AND OBJECTIVE BOX
CC:  Patient is a 89y old  Female who presents with a chief complaint of cough (28 Dec 2017 01:30)    SUBJECTIVE:  OOB in chair.  no complaints.    ROS:  (+) mild, infrequent cough.    acetaminophen   Tablet 650 milliGRAM(s) Oral every 6 hours PRN  apixaban 2.5 milliGRAM(s) Oral every 12 hours  digoxin     Tablet 0.125 milliGRAM(s) Oral daily  diltiazem    milliGRAM(s) Oral daily  famotidine    Tablet 20 milliGRAM(s) Oral daily  guaiFENesin    Syrup 200 milliGRAM(s) Oral every 6 hours PRN  influenza   Vaccine 0.5 milliLiter(s) IntraMuscular once  metoprolol     tartrate 25 milliGRAM(s) Oral every 8 hours    T(C): 36.7 (01-01-18 @ 11:50), Max: 37 (12-31-17 @ 16:44)  HR: 68 (01-01-18 @ 11:50) (68 - 86)  BP: 113/45 (01-01-18 @ 11:50) (106/54 - 122/69)  RR: 20 (01-01-18 @ 11:50) (18 - 20)  SpO2: 100% (01-01-18 @ 11:50) (98% - 100%)    PHYSICAL EXAM:  General apperance: Patient in no acute distress.	  HEENT: No JVD, no cervical lymphadenopathy   CVS: S1 S2 no murmurs, rubs or gallops   Lung: Diminished Breath Sounds bilaterally   Abdomen: Soft non tender non distended + BS   Extremity: no lower extremity edema   MSK: (+) contracture right 3rd and 4th digit.  Back: No spinal tenderness                                8.9    8.0   )-----------( 329      ( 01 Jan 2018 08:01 )             28.5     12-31    138  |  103  |  17  ----------------------------<  88  3.5   |  25  |  0.37<L>    Ca    7.7<L>      31 Dec 2017 07:35

## 2018-01-02 ENCOUNTER — TRANSCRIPTION ENCOUNTER (OUTPATIENT)
Age: 83
End: 2018-01-02

## 2018-01-02 VITALS
RESPIRATION RATE: 16 BRPM | OXYGEN SATURATION: 99 % | DIASTOLIC BLOOD PRESSURE: 56 MMHG | HEART RATE: 59 BPM | SYSTOLIC BLOOD PRESSURE: 100 MMHG | TEMPERATURE: 98 F

## 2018-01-02 LAB
CULTURE RESULTS: SIGNIFICANT CHANGE UP
CULTURE RESULTS: SIGNIFICANT CHANGE UP
HCT VFR BLD CALC: 27.3 % — LOW (ref 34.5–45)
HGB BLD-MCNC: 8.6 G/DL — LOW (ref 11.5–15.5)
MCHC RBC-ENTMCNC: 26.4 PG — LOW (ref 27–34)
MCHC RBC-ENTMCNC: 31.6 GM/DL — LOW (ref 32–36)
MCV RBC AUTO: 83.5 FL — SIGNIFICANT CHANGE UP (ref 80–100)
PLATELET # BLD AUTO: 336 K/UL — SIGNIFICANT CHANGE UP (ref 150–400)
RBC # BLD: 3.28 M/UL — LOW (ref 3.8–5.2)
RBC # FLD: 17.9 % — HIGH (ref 10.3–14.5)
SPECIMEN SOURCE: SIGNIFICANT CHANGE UP
SPECIMEN SOURCE: SIGNIFICANT CHANGE UP
WBC # BLD: 8.4 K/UL — SIGNIFICANT CHANGE UP (ref 3.8–10.5)
WBC # FLD AUTO: 8.4 K/UL — SIGNIFICANT CHANGE UP (ref 3.8–10.5)

## 2018-01-02 RX ADMIN — FAMOTIDINE 20 MILLIGRAM(S): 10 INJECTION INTRAVENOUS at 12:25

## 2018-01-02 RX ADMIN — Medication 0.12 MILLIGRAM(S): at 05:41

## 2018-01-02 RX ADMIN — Medication 650 MILLIGRAM(S): at 05:41

## 2018-01-02 RX ADMIN — Medication 120 MILLIGRAM(S): at 05:41

## 2018-01-02 RX ADMIN — APIXABAN 2.5 MILLIGRAM(S): 2.5 TABLET, FILM COATED ORAL at 05:41

## 2018-01-02 RX ADMIN — Medication 25 MILLIGRAM(S): at 05:41

## 2018-01-02 NOTE — PHYSICAL THERAPY INITIAL EVALUATION ADULT - MODALITIES TREATMENT COMMENTS
arthritic changes to fingers, hypertrophic L elbow, decreased function LUE noted w/ attempt to drink beverage

## 2018-01-02 NOTE — DISCHARGE NOTE ADULT - CARE PLAN
Principal Discharge DX:	Urinary tract infection without hematuria, site unspecified  Goal:	prevent further admission  Instructions for follow-up, activity and diet:	Follow up with PMD

## 2018-01-02 NOTE — PHYSICAL THERAPY INITIAL EVALUATION ADULT - ACTIVE RANGE OF MOTION EXAMINATION, REHAB EVAL
except L elbow flex AAROM WFL, L elbow ext AAROM -30 deg, wrist NT, able to wiggle fingers, R knee ext -30 deg/bilateral upper extremity Active ROM was WFL (within functional limits)/bilateral  lower extremity Active ROM was WFL (within functional limits)

## 2018-01-02 NOTE — PHYSICAL THERAPY INITIAL EVALUATION ADULT - CRITERIA FOR SKILLED THERAPEUTIC INTERVENTIONS
impairments found/functional limitations in following categories/predicted duration of therapy intervention/risk reduction/prevention/rehab potential/anticipated equipment needs at discharge/therapy frequency/anticipated discharge recommendation

## 2018-01-02 NOTE — PHYSICAL THERAPY INITIAL EVALUATION ADULT - ADDITIONAL COMMENTS
S/P CABG (coronary artery bypass graft)
pt adm from [facility]. Per pt (?historian), lives alone in 1 level house w/ few PENNIE w/ ? ela, kelsey w/o AD

## 2018-01-02 NOTE — DISCHARGE NOTE ADULT - PATIENT PORTAL LINK FT
“You can access the [facility] Patient Portal, offered by [facility], by registering with the following website: [email or web address]”

## 2018-01-02 NOTE — DISCHARGE NOTE ADULT - MEDICATION SUMMARY - MEDICATIONS TO TAKE
I will START or STAY ON the medications listed below when I get home from the hospital:    acetaminophen 500 mg oral tablet  -- 2 tab(s) by mouth every 6 hours  -- Indication: For pain    Cardizem CD  -- 120 milligram(s) by mouth once a day  -- Indication: For Afib    digoxin 125 mcg (0.125 mg) oral tablet  -- 1 tab(s) by mouth once a day  -- Indication: For Afib    Eliquis 2.5 mg oral tablet  -- 1 tab(s) by mouth 2 times a day  -- Indication: For Afib    Pravachol  -- 20 milligram(s) by mouth once a day (at bedtime)  -- Indication: For HLD    metoprolol tartrate 25 mg oral tablet  -- orally every 8 hours  -- Indication: For Afib    famotidine  -- 20 milligram(s) by mouth 2 times a day  -- Indication: For GERD

## 2018-01-02 NOTE — DISCHARGE NOTE ADULT - HOSPITAL COURSE
01/02/18: Patient seen and examined. Completed IV rocephin for UTI. Medically stable for discharge.         Vital Signs Last 24 Hrs  T(C): 37.1 (02 Jan 2018 11:59), Max: 37.3 (01 Jan 2018 16:00)  T(F): 98.7 (02 Jan 2018 11:59), Max: 99.2 (01 Jan 2018 16:00)  HR: 56 (02 Jan 2018 11:59) (56 - 83)  BP: 100/80 (02 Jan 2018 11:59) (100/80 - 121/50)  BP(mean): --  RR: 18 (02 Jan 2018 11:59) (18 - 18)  SpO2: 99% (02 Jan 2018 11:59) (95% - 100%)      PHYSICAL EXAM:  General apperance: Patient in no acute distress.	  HEENT: No JVD, no cervical lymphadenopathy   CVS: S1 S2 no murmurs, rubs or gallops   Lung: Diminished Breath Sounds bilaterally   Abdomen: Soft non tender non distended + BS   Extremity: no lower extremity edema   MSK: (+) contracture right 3rd and 4th digit.  Back: No spinal tenderness               Assessment and Plan:   · Assessment		  89F.  admitted 12/27/17.  from [facility].  p/w cough x 2 weeks + fever.  also, c/o left wrist erythema and tenderness.    PMHx:  AF (E);  HF.    UTI.  fever.  leukocytosis.  -UCx, (+) 100K Proteus.  -d/c'd ABx.  -ID input noted.    cough.  -(+) RVP, human metapneumovirus.  -supportive measures.    normocytic anemia.  Possibly chr  Outpatient follow up    hyponatremia.  -resolved.  -d/c IVFs.  -f/u AM BMP.    left wrist pain.  -xray c/w age-indeterminate avulsion fracture of the ulnar styloid and possibly pisiform bone.   -Plastic surgery input noted.    hx AF.  -rate controlled.  -c/w Eliquis.  -c/w NDP-CCB + BB.    advanced directive.  -DNR.    D/C to NH today.  Spent more than 30 minutes to prepare the discharge.

## 2018-01-02 NOTE — PHYSICAL THERAPY INITIAL EVALUATION ADULT - GAIT DEVIATIONS NOTED, PT EVAL
L foot sliding, unable to come to full stand w/ RW despite max of 1, able to perform SPT w/ max of 1

## 2018-01-02 NOTE — PHYSICAL THERAPY INITIAL EVALUATION ADULT - PERTINENT HX OF CURRENT PROBLEM, REHAB EVAL
adm from [facility], p/w cough x 2 weeks + fever. RVP (+). Also c/o L wrist pain-xray c/w age-indeterminate avulsion fracture of the ulnar styloid and possibly pisiform bone. per ortho clinical exam suggests possible pseudogout, WBAT, can wear cock-up splint for comfort. L elbow XR + severe degen changes. adm from [facility], p/w cough x 2 weeks + fever. RVP (+). Also c/o (chronic) L wrist pain-xray c/w age-indeterminate avulsion fracture of the ulnar styloid and possibly pisiform bone. per ortho clinical exam suggests possible pseudogout, WBAT, can wear cock-up splint for comfort. L elbow XR + severe degen changes.

## 2018-01-08 DIAGNOSIS — Z79.01 LONG TERM (CURRENT) USE OF ANTICOAGULANTS: ICD-10-CM

## 2018-01-08 DIAGNOSIS — E87.1 HYPO-OSMOLALITY AND HYPONATREMIA: ICD-10-CM

## 2018-01-08 DIAGNOSIS — D64.9 ANEMIA, UNSPECIFIED: ICD-10-CM

## 2018-01-08 DIAGNOSIS — A41.9 SEPSIS, UNSPECIFIED ORGANISM: ICD-10-CM

## 2018-01-08 DIAGNOSIS — J96.20 ACUTE AND CHRONIC RESPIRATORY FAILURE, UNSPECIFIED WHETHER WITH HYPOXIA OR HYPERCAPNIA: ICD-10-CM

## 2018-01-08 DIAGNOSIS — M06.9 RHEUMATOID ARTHRITIS, UNSPECIFIED: ICD-10-CM

## 2018-01-08 DIAGNOSIS — Z66 DO NOT RESUSCITATE: ICD-10-CM

## 2018-01-08 DIAGNOSIS — I48.91 UNSPECIFIED ATRIAL FIBRILLATION: ICD-10-CM

## 2018-01-08 DIAGNOSIS — I50.9 HEART FAILURE, UNSPECIFIED: ICD-10-CM

## 2018-01-08 DIAGNOSIS — D72.821 MONOCYTOSIS (SYMPTOMATIC): ICD-10-CM

## 2018-01-08 DIAGNOSIS — N39.0 URINARY TRACT INFECTION, SITE NOT SPECIFIED: ICD-10-CM

## 2018-01-08 DIAGNOSIS — B97.81 HUMAN METAPNEUMOVIRUS AS THE CAUSE OF DISEASES CLASSIFIED ELSEWHERE: ICD-10-CM

## 2018-01-08 DIAGNOSIS — R65.20 SEVERE SEPSIS WITHOUT SEPTIC SHOCK: ICD-10-CM

## 2018-01-08 DIAGNOSIS — B96.4 PROTEUS (MIRABILIS) (MORGANII) AS THE CAUSE OF DISEASES CLASSIFIED ELSEWHERE: ICD-10-CM

## 2018-01-08 DIAGNOSIS — M11.232 OTHER CHONDROCALCINOSIS, LEFT WRIST: ICD-10-CM

## 2018-04-20 ENCOUNTER — EMERGENCY (EMERGENCY)
Facility: HOSPITAL | Age: 83
LOS: 0 days | Discharge: HOME | End: 2018-04-20
Attending: STUDENT IN AN ORGANIZED HEALTH CARE EDUCATION/TRAINING PROGRAM | Admitting: STUDENT IN AN ORGANIZED HEALTH CARE EDUCATION/TRAINING PROGRAM
Payer: MEDICARE

## 2018-04-20 VITALS
DIASTOLIC BLOOD PRESSURE: 53 MMHG | SYSTOLIC BLOOD PRESSURE: 100 MMHG | WEIGHT: 119.93 LBS | HEART RATE: 81 BPM | OXYGEN SATURATION: 95 % | TEMPERATURE: 98 F | RESPIRATION RATE: 17 BRPM

## 2018-04-20 VITALS
HEART RATE: 80 BPM | RESPIRATION RATE: 17 BRPM | OXYGEN SATURATION: 95 % | TEMPERATURE: 98 F | SYSTOLIC BLOOD PRESSURE: 110 MMHG | DIASTOLIC BLOOD PRESSURE: 58 MMHG

## 2018-04-20 DIAGNOSIS — I50.9 HEART FAILURE, UNSPECIFIED: ICD-10-CM

## 2018-04-20 DIAGNOSIS — R31.9 HEMATURIA, UNSPECIFIED: ICD-10-CM

## 2018-04-20 DIAGNOSIS — Z90.710 ACQUIRED ABSENCE OF BOTH CERVIX AND UTERUS: ICD-10-CM

## 2018-04-20 DIAGNOSIS — I48.91 UNSPECIFIED ATRIAL FIBRILLATION: ICD-10-CM

## 2018-04-20 DIAGNOSIS — Z96.641 PRESENCE OF RIGHT ARTIFICIAL HIP JOINT: Chronic | ICD-10-CM

## 2018-04-20 DIAGNOSIS — Z79.01 LONG TERM (CURRENT) USE OF ANTICOAGULANTS: ICD-10-CM

## 2018-04-20 DIAGNOSIS — Z96.641 PRESENCE OF RIGHT ARTIFICIAL HIP JOINT: ICD-10-CM

## 2018-04-20 DIAGNOSIS — Z90.710 ACQUIRED ABSENCE OF BOTH CERVIX AND UTERUS: Chronic | ICD-10-CM

## 2018-04-20 DIAGNOSIS — M06.9 RHEUMATOID ARTHRITIS, UNSPECIFIED: ICD-10-CM

## 2018-04-20 LAB
ALBUMIN SERPL ELPH-MCNC: 2.3 G/DL — LOW (ref 3.3–5)
ALP SERPL-CCNC: 75 U/L — SIGNIFICANT CHANGE UP (ref 40–120)
ALT FLD-CCNC: 14 U/L — SIGNIFICANT CHANGE UP (ref 12–78)
ANION GAP SERPL CALC-SCNC: 8 MMOL/L — SIGNIFICANT CHANGE UP (ref 5–17)
ANISOCYTOSIS BLD QL: SLIGHT — SIGNIFICANT CHANGE UP
APPEARANCE UR: (no result)
APTT BLD: 28.8 SEC — SIGNIFICANT CHANGE UP (ref 27.5–37.4)
AST SERPL-CCNC: 14 U/L — LOW (ref 15–37)
BACTERIA # UR AUTO: (no result)
BASOPHILS # BLD AUTO: 0 K/UL — SIGNIFICANT CHANGE UP (ref 0–0.2)
BASOPHILS NFR BLD AUTO: 0 % — SIGNIFICANT CHANGE UP (ref 0–2)
BILIRUB SERPL-MCNC: 0.4 MG/DL — SIGNIFICANT CHANGE UP (ref 0.2–1.2)
BILIRUB UR-MCNC: NEGATIVE — SIGNIFICANT CHANGE UP
BLD GP AB SCN SERPL QL: SIGNIFICANT CHANGE UP
BUN SERPL-MCNC: 27 MG/DL — HIGH (ref 7–23)
CALCIUM SERPL-MCNC: 8.2 MG/DL — LOW (ref 8.5–10.1)
CHLORIDE SERPL-SCNC: 98 MMOL/L — SIGNIFICANT CHANGE UP (ref 96–108)
CO2 SERPL-SCNC: 28 MMOL/L — SIGNIFICANT CHANGE UP (ref 22–31)
COLOR SPEC: (no result)
CREAT SERPL-MCNC: 0.87 MG/DL — SIGNIFICANT CHANGE UP (ref 0.5–1.3)
DIFF PNL FLD: (no result)
ELLIPTOCYTES BLD QL SMEAR: SLIGHT — SIGNIFICANT CHANGE UP
EOSINOPHIL # BLD AUTO: 0 K/UL — SIGNIFICANT CHANGE UP (ref 0–0.5)
EOSINOPHIL NFR BLD AUTO: 0 % — SIGNIFICANT CHANGE UP (ref 0–6)
EPI CELLS # UR: NEGATIVE — SIGNIFICANT CHANGE UP
GLUCOSE SERPL-MCNC: 92 MG/DL — SIGNIFICANT CHANGE UP (ref 70–99)
GLUCOSE UR QL: NEGATIVE MG/DL — SIGNIFICANT CHANGE UP
HCT VFR BLD CALC: 27.3 % — LOW (ref 34.5–45)
HGB BLD-MCNC: 8.7 G/DL — LOW (ref 11.5–15.5)
HYPOCHROMIA BLD QL: SLIGHT — SIGNIFICANT CHANGE UP
INR BLD: 1.68 RATIO — HIGH (ref 0.88–1.16)
KETONES UR-MCNC: NEGATIVE — SIGNIFICANT CHANGE UP
LEUKOCYTE ESTERASE UR-ACNC: NEGATIVE — SIGNIFICANT CHANGE UP
LG PLATELETS BLD QL AUTO: SLIGHT — SIGNIFICANT CHANGE UP
LIDOCAIN IGE QN: 163 U/L — SIGNIFICANT CHANGE UP (ref 73–393)
LYMPHOCYTES # BLD AUTO: 17 % — SIGNIFICANT CHANGE UP (ref 13–44)
LYMPHOCYTES # BLD AUTO: 2.4 K/UL — SIGNIFICANT CHANGE UP (ref 1–3.3)
MANUAL SMEAR VERIFICATION: YES — SIGNIFICANT CHANGE UP
MCHC RBC-ENTMCNC: 26.2 PG — LOW (ref 27–34)
MCHC RBC-ENTMCNC: 31.9 GM/DL — LOW (ref 32–36)
MCV RBC AUTO: 82.2 FL — SIGNIFICANT CHANGE UP (ref 80–100)
MONOCYTES # BLD AUTO: 2.82 K/UL — HIGH (ref 0–0.9)
MONOCYTES NFR BLD AUTO: 20 % — HIGH (ref 2–14)
NEUTROPHILS # BLD AUTO: 8.47 K/UL — HIGH (ref 1.8–7.4)
NEUTROPHILS NFR BLD AUTO: 59 % — SIGNIFICANT CHANGE UP (ref 43–77)
NEUTS BAND # BLD: 1 % — SIGNIFICANT CHANGE UP (ref 0–8)
NITRITE UR-MCNC: NEGATIVE — SIGNIFICANT CHANGE UP
NRBC # BLD: 2 /100 — HIGH (ref 0–0)
NRBC # BLD: SIGNIFICANT CHANGE UP /100 WBCS (ref 0–0)
PH UR: 8 — SIGNIFICANT CHANGE UP (ref 5–8)
PLAT MORPH BLD: (no result)
PLATELET # BLD AUTO: 153 K/UL — SIGNIFICANT CHANGE UP (ref 150–400)
PLATELET COUNT - ESTIMATE: NORMAL — SIGNIFICANT CHANGE UP
POIKILOCYTOSIS BLD QL AUTO: SLIGHT — SIGNIFICANT CHANGE UP
POLYCHROMASIA BLD QL SMEAR: SLIGHT — SIGNIFICANT CHANGE UP
POTASSIUM SERPL-MCNC: 4.4 MMOL/L — SIGNIFICANT CHANGE UP (ref 3.5–5.3)
POTASSIUM SERPL-SCNC: 4.4 MMOL/L — SIGNIFICANT CHANGE UP (ref 3.5–5.3)
PROT SERPL-MCNC: 6.6 GM/DL — SIGNIFICANT CHANGE UP (ref 6–8.3)
PROT UR-MCNC: 500 MG/DL
PROTHROM AB SERPL-ACNC: 18.3 SEC — HIGH (ref 9.8–12.7)
RBC # BLD: 3.32 M/UL — LOW (ref 3.8–5.2)
RBC # FLD: 19.4 % — HIGH (ref 10.3–14.5)
RBC BLD AUTO: (no result)
RBC CASTS # UR COMP ASSIST: >50 /HPF (ref 0–4)
SODIUM SERPL-SCNC: 134 MMOL/L — LOW (ref 135–145)
SP GR SPEC: 1.01 — SIGNIFICANT CHANGE UP (ref 1.01–1.02)
TYPE + AB SCN PNL BLD: SIGNIFICANT CHANGE UP
UROBILINOGEN FLD QL: NEGATIVE MG/DL — SIGNIFICANT CHANGE UP
VARIANT LYMPHS # BLD: 3 % — SIGNIFICANT CHANGE UP (ref 0–6)
WBC # BLD: 14.11 K/UL — HIGH (ref 3.8–10.5)
WBC # FLD AUTO: 14.11 K/UL — HIGH (ref 3.8–10.5)
WBC UR QL: (no result)

## 2018-04-20 PROCEDURE — 99285 EMERGENCY DEPT VISIT HI MDM: CPT

## 2018-04-20 PROCEDURE — 74176 CT ABD & PELVIS W/O CONTRAST: CPT | Mod: 26

## 2018-04-20 NOTE — ED PROVIDER NOTE - PROGRESS NOTE DETAILS
Sheryl RECINOS- Case discussed with Dr. Valero on call for Urology, who reviewed patient's labs and workup, recommended that a Rashid be placed and patient can be discharged home to follow up as an outpatient. I Crista Bradford attest that this documentation has been prepared under the direction and in the presence of Doctor Almazan. Sheryl RECINOS- Case discussed with Dr. Valero on call for Urology, who reviewed patient's labs and workup, recommended that a Rashid be placed and patient can be discharged home to follow up as an outpatient. Long discussion with family- in agreement at this time.

## 2018-04-20 NOTE — ED PROVIDER NOTE - OBJECTIVE STATEMENT
89 y/o F with PMHx of Afib on Eliquis, RA and CHF BIB EMS from [facility] c/o hematuria for the past 4 days. 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 Pt is DNR per paperwork. Pt denies pain at this time. Denies burning urination.

## 2018-04-20 NOTE — ED PROVIDER NOTE - PMH
Afib    CHF (congestive heart failure)    Rheumatoid arthritis, involving unspecified site, unspecified rheumatoid factor presence

## 2018-04-20 NOTE — ED ADULT NURSE NOTE - OBJECTIVE STATEMENT
Pt biba from [facility] for complaints of hematuria.  pt denies any pain, sob or chest pain

## 2018-04-20 NOTE — ED PROVIDER NOTE - MEDICAL DECISION MAKING DETAILS
89 y/o F with painless hematuria x 4 days. Concern for renal mass. Sent for evaluation. Check labs and imaging and re-assess.

## 2018-04-21 LAB
CULTURE RESULTS: SIGNIFICANT CHANGE UP
SPECIMEN SOURCE: SIGNIFICANT CHANGE UP

## 2019-11-12 NOTE — ED PROVIDER NOTE - NS ED MD DISPO ADMIT HH
----- Message from Mini Taylor sent at 11/12/2019  1:39 PM CST -----  Doctor appointment and lab have been scheduled.  Please link lab orders to the lab appointment.  Date of doctor appointment:  01/06/20  Date of lab appointment:   12/30/19  Physical or EP: physical  Comments: please attach lab order.       MED-SURG

## 2020-02-06 NOTE — ED ADULT NURSE NOTE - NS ED NOTE ABUSE SUSPICION NEGLECT YN
SUBJECTIVE:  Patient ID: Manuel Clark is a 64 y.o. male is here today for follow-up.    Chief Complaint: Neck pain  Chief Complaint   Patient presents with   • neck & arm pain     patient is here for follow up after completing 2-3 physical therapy b/c he states it was too painful; patient had xrays @ [facility] on 10/30/2019.  Patient's MRI was done @ [facility] as well.  Patient saw Dr Peter in Dec 2019 for discuss of injections.  Patient did have a nerve block but states this made him worse and gave him new aches/pains.       HPI  64-year-old gentleman with a chronic history of back pain.  He was asked to do a dedicated course of physical therapy which he only did a couple of sessions.  He says that he did physical therapy about 18 months ago for his neck and it did not improve it.  He has had one injection from a pain management specialist but did not think that it was helpful.  He currently does not take any oral pain medication.  He describes intermittent chronic neck pain along with numbness and tingling in the left upper extremity that is in ulnar nerve cubital tunnel distribution not a radicular distribution.    The following portions of the patient's history were reviewed and updated as appropriate: allergies, current medications, past family history, past medical history, past social history, past surgical history and problem list.    OBJECTIVE:    Review of Systems   Musculoskeletal: Positive for arthralgias, back pain and neck pain.   Neurological: Positive for numbness.   All other systems reviewed and are negative.         Physical Exam   Constitutional: He is oriented to person, place, and time. He appears well-developed and well-nourished.   HENT:   Head: Normocephalic and atraumatic.   Right Ear: Hearing normal.   Left Ear: Hearing normal.   Eyes: Pupils are equal, round, and reactive to light. EOM are normal.   Neck: Normal range of motion.   Neurological: He is alert and oriented to person, place, and  time. He has normal strength and normal reflexes. No cranial nerve deficit or sensory deficit. He displays a negative Romberg sign. GCS eye subscore is 4. GCS verbal subscore is 5. GCS motor subscore is 6. He displays no Babinski's sign on the right side. He displays no Babinski's sign on the left side.   Psychiatric: His speech is normal. Judgment normal. Cognition and memory are normal.       Neurologic Exam     Mental Status   Oriented to person, place, and time.   Speech: speech is normal     Cranial Nerves     CN III, IV, VI   Pupils are equal, round, and reactive to light.  Extraocular motions are normal.     Motor Exam     Strength   Strength 5/5 throughout.       Independent Review of Radiographic Studies:    MRI of the cervical spine shows moderate degenerative disc disease at C5-6 and 6-7 with some foraminal stenosis at those levels.    ASSESSMENT/PLAN:  The patient complains of intermittent chronic neck pain.  He has attempted physical therapy  twice without any improvement.  I would recommend that he exhaust all nonsurgical treatments prior to considering any surgical intervention.  We will asked that he continue with interventional pain management injections for the time being and we can reevaluate him in the future should does not improve his neck pain.    Regarding the numbness in his left hand this is clinically more consistent with a cubital tunnel syndrome rather than a radiculopathy.  I would recommend an EMG nerve conduction study as the next step in the evaluation of the hand.    1. Spinal stenosis in cervical region    2. Numbness and tingling of left upper extremity    3. Smoker    4. BMI 40.0-44.9, adult (CMS/[facility])            Return in about 4 weeks (around 3/5/2020) for follow up w/Jj on a Dr Flowers clinic day.      Zain Flowers MD             No
